# Patient Record
Sex: MALE | Race: WHITE | HISPANIC OR LATINO | Employment: OTHER | URBAN - METROPOLITAN AREA
[De-identification: names, ages, dates, MRNs, and addresses within clinical notes are randomized per-mention and may not be internally consistent; named-entity substitution may affect disease eponyms.]

---

## 2020-08-28 ENCOUNTER — APPOINTMENT (OUTPATIENT)
Dept: RADIOLOGY | Facility: MEDICAL CENTER | Age: 65
DRG: 066 | End: 2020-08-28
Attending: EMERGENCY MEDICINE

## 2020-08-28 ENCOUNTER — HOSPITAL ENCOUNTER (INPATIENT)
Facility: MEDICAL CENTER | Age: 65
LOS: 2 days | DRG: 066 | End: 2020-08-30
Attending: EMERGENCY MEDICINE | Admitting: INTERNAL MEDICINE

## 2020-08-28 DIAGNOSIS — R29.90 STROKE-LIKE SYMPTOM: ICD-10-CM

## 2020-08-28 PROBLEM — Z66 DNR (DO NOT RESUSCITATE): Status: ACTIVE | Noted: 2020-08-28

## 2020-08-28 PROBLEM — I16.0 HYPERTENSIVE URGENCY: Status: ACTIVE | Noted: 2020-08-28

## 2020-08-28 LAB
ALBUMIN SERPL BCP-MCNC: 4.5 G/DL (ref 3.2–4.9)
ALBUMIN/GLOB SERPL: 1.4 G/DL
ALP SERPL-CCNC: 76 U/L (ref 30–99)
ALT SERPL-CCNC: 24 U/L (ref 2–50)
ANION GAP SERPL CALC-SCNC: 16 MMOL/L (ref 7–16)
AST SERPL-CCNC: 22 U/L (ref 12–45)
BASOPHILS # BLD AUTO: 0.5 % (ref 0–1.8)
BASOPHILS # BLD: 0.03 K/UL (ref 0–0.12)
BILIRUB SERPL-MCNC: 0.7 MG/DL (ref 0.1–1.5)
BUN SERPL-MCNC: 14 MG/DL (ref 8–22)
CALCIUM SERPL-MCNC: 9.6 MG/DL (ref 8.5–10.5)
CHLORIDE SERPL-SCNC: 103 MMOL/L (ref 96–112)
CO2 SERPL-SCNC: 20 MMOL/L (ref 20–33)
COVID ORDER STATUS COVID19: NORMAL
CREAT SERPL-MCNC: 0.83 MG/DL (ref 0.5–1.4)
EKG IMPRESSION: NORMAL
EOSINOPHIL # BLD AUTO: 0.17 K/UL (ref 0–0.51)
EOSINOPHIL NFR BLD: 3 % (ref 0–6.9)
ERYTHROCYTE [DISTWIDTH] IN BLOOD BY AUTOMATED COUNT: 37.2 FL (ref 35.9–50)
GLOBULIN SER CALC-MCNC: 3.2 G/DL (ref 1.9–3.5)
GLUCOSE SERPL-MCNC: 105 MG/DL (ref 65–99)
HCT VFR BLD AUTO: 47.8 % (ref 42–52)
HGB BLD-MCNC: 16.4 G/DL (ref 14–18)
IMM GRANULOCYTES # BLD AUTO: 0.01 K/UL (ref 0–0.11)
IMM GRANULOCYTES NFR BLD AUTO: 0.2 % (ref 0–0.9)
INR PPP: 0.98 (ref 0.87–1.13)
LYMPHOCYTES # BLD AUTO: 1.93 K/UL (ref 1–4.8)
LYMPHOCYTES NFR BLD: 33.8 % (ref 22–41)
MCH RBC QN AUTO: 29.2 PG (ref 27–33)
MCHC RBC AUTO-ENTMCNC: 34.3 G/DL (ref 33.7–35.3)
MCV RBC AUTO: 85.2 FL (ref 81.4–97.8)
MONOCYTES # BLD AUTO: 0.6 K/UL (ref 0–0.85)
MONOCYTES NFR BLD AUTO: 10.5 % (ref 0–13.4)
NEUTROPHILS # BLD AUTO: 2.97 K/UL (ref 1.82–7.42)
NEUTROPHILS NFR BLD: 52 % (ref 44–72)
NRBC # BLD AUTO: 0 K/UL
NRBC BLD-RTO: 0 /100 WBC
PLATELET # BLD AUTO: 210 K/UL (ref 164–446)
PMV BLD AUTO: 10.5 FL (ref 9–12.9)
POTASSIUM SERPL-SCNC: 3.5 MMOL/L (ref 3.6–5.5)
PROT SERPL-MCNC: 7.7 G/DL (ref 6–8.2)
PROTHROMBIN TIME: 13.2 SEC (ref 12–14.6)
RBC # BLD AUTO: 5.61 M/UL (ref 4.7–6.1)
SARS-COV-2 RNA RESP QL NAA+PROBE: NOTDETECTED
SODIUM SERPL-SCNC: 139 MMOL/L (ref 135–145)
SPECIMEN SOURCE: NORMAL
WBC # BLD AUTO: 5.7 K/UL (ref 4.8–10.8)

## 2020-08-28 PROCEDURE — 770020 HCHG ROOM/CARE - TELE (206)

## 2020-08-28 PROCEDURE — U0003 INFECTIOUS AGENT DETECTION BY NUCLEIC ACID (DNA OR RNA); SEVERE ACUTE RESPIRATORY SYNDROME CORONAVIRUS 2 (SARS-COV-2) (CORONAVIRUS DISEASE [COVID-19]), AMPLIFIED PROBE TECHNIQUE, MAKING USE OF HIGH THROUGHPUT TECHNOLOGIES AS DESCRIBED BY CMS-2020-01-R: HCPCS

## 2020-08-28 PROCEDURE — 700101 HCHG RX REV CODE 250: Performed by: INTERNAL MEDICINE

## 2020-08-28 PROCEDURE — 93005 ELECTROCARDIOGRAM TRACING: CPT

## 2020-08-28 PROCEDURE — A9270 NON-COVERED ITEM OR SERVICE: HCPCS | Performed by: INTERNAL MEDICINE

## 2020-08-28 PROCEDURE — C9803 HOPD COVID-19 SPEC COLLECT: HCPCS | Performed by: INTERNAL MEDICINE

## 2020-08-28 PROCEDURE — 99223 1ST HOSP IP/OBS HIGH 75: CPT | Performed by: INTERNAL MEDICINE

## 2020-08-28 PROCEDURE — 70450 CT HEAD/BRAIN W/O DYE: CPT

## 2020-08-28 PROCEDURE — 700102 HCHG RX REV CODE 250 W/ 637 OVERRIDE(OP): Performed by: INTERNAL MEDICINE

## 2020-08-28 PROCEDURE — 80053 COMPREHEN METABOLIC PANEL: CPT

## 2020-08-28 PROCEDURE — 96374 THER/PROPH/DIAG INJ IV PUSH: CPT

## 2020-08-28 PROCEDURE — 700101 HCHG RX REV CODE 250: Performed by: EMERGENCY MEDICINE

## 2020-08-28 PROCEDURE — 85025 COMPLETE CBC W/AUTO DIFF WBC: CPT

## 2020-08-28 PROCEDURE — 700105 HCHG RX REV CODE 258: Performed by: INTERNAL MEDICINE

## 2020-08-28 PROCEDURE — 93005 ELECTROCARDIOGRAM TRACING: CPT | Performed by: EMERGENCY MEDICINE

## 2020-08-28 PROCEDURE — 92610 EVALUATE SWALLOWING FUNCTION: CPT

## 2020-08-28 PROCEDURE — 85610 PROTHROMBIN TIME: CPT

## 2020-08-28 PROCEDURE — 99285 EMERGENCY DEPT VISIT HI MDM: CPT

## 2020-08-28 RX ORDER — SODIUM CHLORIDE 9 MG/ML
1000 INJECTION, SOLUTION INTRAVENOUS ONCE
Status: DISCONTINUED | OUTPATIENT
Start: 2020-08-29 | End: 2020-08-29

## 2020-08-28 RX ORDER — LORAZEPAM 2 MG/ML
1 INJECTION INTRAMUSCULAR
Status: COMPLETED | OUTPATIENT
Start: 2020-08-28 | End: 2020-08-29

## 2020-08-28 RX ORDER — AMOXICILLIN 250 MG
2 CAPSULE ORAL 2 TIMES DAILY
Status: DISCONTINUED | OUTPATIENT
Start: 2020-08-28 | End: 2020-08-30 | Stop reason: HOSPADM

## 2020-08-28 RX ORDER — POLYETHYLENE GLYCOL 3350 17 G/17G
1 POWDER, FOR SOLUTION ORAL
Status: DISCONTINUED | OUTPATIENT
Start: 2020-08-28 | End: 2020-08-30 | Stop reason: HOSPADM

## 2020-08-28 RX ORDER — LISINOPRIL 20 MG/1
20 TABLET ORAL ONCE
Status: DISCONTINUED | OUTPATIENT
Start: 2020-08-29 | End: 2020-08-28

## 2020-08-28 RX ORDER — ASPIRIN 81 MG/1
81 TABLET, CHEWABLE ORAL DAILY
COMMUNITY

## 2020-08-28 RX ORDER — SODIUM CHLORIDE 9 MG/ML
1000 INJECTION, SOLUTION INTRAVENOUS ONCE
Status: COMPLETED | OUTPATIENT
Start: 2020-08-28 | End: 2020-08-29

## 2020-08-28 RX ORDER — LISINOPRIL 10 MG/1
10 TABLET ORAL ONCE
Status: COMPLETED | OUTPATIENT
Start: 2020-08-28 | End: 2020-08-28

## 2020-08-28 RX ORDER — ASPIRIN 325 MG
325 TABLET ORAL DAILY
Status: DISCONTINUED | OUTPATIENT
Start: 2020-08-28 | End: 2020-08-29

## 2020-08-28 RX ORDER — ASPIRIN 300 MG/1
300 SUPPOSITORY RECTAL DAILY
Status: DISCONTINUED | OUTPATIENT
Start: 2020-08-28 | End: 2020-08-29

## 2020-08-28 RX ORDER — LABETALOL HYDROCHLORIDE 5 MG/ML
20 INJECTION, SOLUTION INTRAVENOUS ONCE
Status: COMPLETED | OUTPATIENT
Start: 2020-08-28 | End: 2020-08-28

## 2020-08-28 RX ORDER — LISINOPRIL 10 MG/1
10 TABLET ORAL
Status: DISCONTINUED | OUTPATIENT
Start: 2020-08-28 | End: 2020-08-28

## 2020-08-28 RX ORDER — LISINOPRIL 20 MG/1
20 TABLET ORAL
Status: DISCONTINUED | OUTPATIENT
Start: 2020-08-29 | End: 2020-08-29

## 2020-08-28 RX ORDER — BISACODYL 10 MG
10 SUPPOSITORY, RECTAL RECTAL
Status: DISCONTINUED | OUTPATIENT
Start: 2020-08-28 | End: 2020-08-30 | Stop reason: HOSPADM

## 2020-08-28 RX ORDER — SODIUM CHLORIDE 9 MG/ML
1000 INJECTION, SOLUTION INTRAVENOUS ONCE
Status: DISCONTINUED | OUTPATIENT
Start: 2020-08-28 | End: 2020-08-28

## 2020-08-28 RX ORDER — ACETAMINOPHEN 325 MG/1
650 TABLET ORAL EVERY 6 HOURS PRN
Status: DISCONTINUED | OUTPATIENT
Start: 2020-08-28 | End: 2020-08-30 | Stop reason: HOSPADM

## 2020-08-28 RX ORDER — LABETALOL HYDROCHLORIDE 5 MG/ML
10 INJECTION, SOLUTION INTRAVENOUS EVERY 4 HOURS PRN
Status: DISCONTINUED | OUTPATIENT
Start: 2020-08-28 | End: 2020-08-30 | Stop reason: HOSPADM

## 2020-08-28 RX ORDER — LISINOPRIL 10 MG/1
10 TABLET ORAL ONCE
Status: DISCONTINUED | OUTPATIENT
Start: 2020-08-28 | End: 2020-08-28

## 2020-08-28 RX ORDER — ASPIRIN 81 MG/1
324 TABLET, CHEWABLE ORAL DAILY
Status: DISCONTINUED | OUTPATIENT
Start: 2020-08-28 | End: 2020-08-29

## 2020-08-28 RX ADMIN — LABETALOL HYDROCHLORIDE 10 MG: 5 INJECTION INTRAVENOUS at 15:55

## 2020-08-28 RX ADMIN — LISINOPRIL 10 MG: 10 TABLET ORAL at 13:39

## 2020-08-28 RX ADMIN — SODIUM CHLORIDE 1000 ML: 9 INJECTION, SOLUTION INTRAVENOUS at 15:55

## 2020-08-28 RX ADMIN — ASPIRIN 325 MG: 325 TABLET ORAL at 13:39

## 2020-08-28 RX ADMIN — LISINOPRIL 10 MG: 10 TABLET ORAL at 18:45

## 2020-08-28 RX ADMIN — LABETALOL HYDROCHLORIDE 20 MG: 5 INJECTION, SOLUTION INTRAVENOUS at 11:40

## 2020-08-28 ASSESSMENT — ENCOUNTER SYMPTOMS
SHORTNESS OF BREATH: 0
CHILLS: 0
HALLUCINATIONS: 0
DIZZINESS: 0
TINGLING: 0
NECK PAIN: 0
ORTHOPNEA: 0
COUGH: 0
FEVER: 0
BACK PAIN: 0
ABDOMINAL PAIN: 0
MYALGIAS: 0
DIARRHEA: 0
WEIGHT LOSS: 0
CONSTIPATION: 0
SPUTUM PRODUCTION: 0
FOCAL WEAKNESS: 0
PALPITATIONS: 0
EYE PAIN: 0
BLURRED VISION: 0
PHOTOPHOBIA: 0
TREMORS: 0
NAUSEA: 0
HEADACHES: 0
SENSORY CHANGE: 0
VOMITING: 0
DOUBLE VISION: 0
SPEECH CHANGE: 1

## 2020-08-28 ASSESSMENT — PATIENT HEALTH QUESTIONNAIRE - PHQ9
1. LITTLE INTEREST OR PLEASURE IN DOING THINGS: NOT AT ALL
2. FEELING DOWN, DEPRESSED, IRRITABLE, OR HOPELESS: NOT AT ALL
SUM OF ALL RESPONSES TO PHQ9 QUESTIONS 1 AND 2: 0

## 2020-08-28 ASSESSMENT — LIFESTYLE VARIABLES: SUBSTANCE_ABUSE: 0

## 2020-08-28 ASSESSMENT — NEW YORK HEART ASSOCIATION (NYHA) CLASSIFICATION
NYHA FUNCTIONAL CLASS: I - NO SYMPTOMS AND NO LIMITATION IN ORDINARY PHYSICAL ACTIVITY, E.G. SHORTNESS OF BREATH WHEN WALKING, CLIMBING STAIRS ETC.

## 2020-08-28 NOTE — H&P
Hospital Medicine History & Physical Note    Date of Service  8/28/2020    Primary Care Physician  No primary care provider on file.    Consultants  None    Code Status  DNAR/DNI      I discussed CODE STATUS with him and explained CPR and difference between full code and DNR after discussion he would like to be DNR.    Chief Complaint  Chief Complaint   Patient presents with   • Slurred Speech   • Hypertension       History of Presenting Illness  64 y.o. male with past medical history of hypertension who presented to the hospital 8/28/2020 with complaint of slurred speech for 4 days.  He noted 4 days ago that he felt like he is drunk and he does not drink alcohol and he noticed slurred speech for 4 days.  He reported his slurred speech has been improving but he continued to have deficit.  He denies any symptoms of weakness in his extremities.  Patient family member at the bedside and reported that she noticed that he has been frequently clearing his throat for the last 4 days and she is concerned about aspiration.  She also reported 4 days ago she noticed slight droop on his left side of face and it went away.  He denies any prior similar complaints.  There is no specific aggravating or alleviating factors.  There is no other associated symptoms.  He has history of high blood pressure but he has not been taking his blood pressure medications.  He does not smoke or drink alcohol or use any street drugs.  He denies any other acute complaints or associated symptoms.    He reported that he does not tolerate statins medication.    ER course: He underwent CT head without contrast did not show any acute abnormalities.  He found a significantly elevated blood pressure and he was started on blood pressure medications.  He has been having the symptoms for last 4 days holding permissive hypertension parameters due to symptoms of 4 days.    Review of Systems  Review of Systems   Constitutional: Negative for chills, fever and  weight loss.   HENT: Negative for hearing loss and tinnitus.    Eyes: Negative for blurred vision, double vision, photophobia and pain.   Respiratory: Negative for cough, sputum production and shortness of breath.    Cardiovascular: Negative for chest pain, palpitations, orthopnea and leg swelling.   Gastrointestinal: Negative for abdominal pain, constipation, diarrhea, nausea and vomiting.   Genitourinary: Negative for dysuria, frequency and urgency.   Musculoskeletal: Negative for back pain, joint pain, myalgias and neck pain.   Skin: Negative for rash.   Neurological: Positive for speech change. Negative for dizziness, tingling, tremors, sensory change, focal weakness and headaches.   Psychiatric/Behavioral: Negative for hallucinations and substance abuse.   All other systems reviewed and are negative.      Past Medical History   has a past medical history of Hypertension.    Surgical History   has a past surgical history that includes other cardiac surgery and other.     Family History  I reviewed family history with patient he does not know his family history.    Social History   reports that he has never smoked. He has never used smokeless tobacco. He reports previous alcohol use. He reports that he does not use drugs.    Allergies  No Known Allergies    Medications  Prior to Admission Medications   Prescriptions Last Dose Informant Patient Reported? Taking?   aspirin (ASA) 81 MG Chew Tab chewable tablet 8/28/2020 at 1000  Yes Yes   Sig: Take 81 mg by mouth every day.      Facility-Administered Medications: None       Physical Exam  Temp:  [36.8 °C (98.2 °F)] 36.8 °C (98.2 °F)  Pulse:  [66-89] 66  Resp:  [16-29] 21  BP: (201-230)/(114-141) 211/114  SpO2:  [92 %-95 %] 94 %    Physical Exam  Vitals signs reviewed.   Constitutional:       General: He is not in acute distress.     Appearance: He is obese.   HENT:      Head: Normocephalic and atraumatic. No contusion.      Right Ear: External ear normal.      Left  Ear: External ear normal.      Nose: Nose normal.      Mouth/Throat:      Mouth: Mucous membranes are dry.      Pharynx: No oropharyngeal exudate.   Eyes:      General:         Right eye: No discharge.         Left eye: No discharge.      Pupils: Pupils are equal, round, and reactive to light.   Neck:      Musculoskeletal: No neck rigidity or muscular tenderness.   Cardiovascular:      Rate and Rhythm: Normal rate and regular rhythm.      Heart sounds: No murmur. No friction rub. No gallop.    Pulmonary:      Effort: Pulmonary effort is normal.      Breath sounds: No wheezing or rhonchi.   Abdominal:      General: Bowel sounds are normal. There is no distension.      Palpations: Abdomen is soft.      Tenderness: There is no abdominal tenderness. There is no rebound.   Musculoskeletal: Normal range of motion.         General: No swelling or tenderness.   Skin:     General: Skin is warm and dry.      Coloration: Skin is not jaundiced.   Neurological:      General: No focal deficit present.      Mental Status: He is alert and oriented to person, place, and time.      Cranial Nerves: No cranial nerve deficit.      Sensory: No sensory deficit.      Comments: Motor 5/5 in all 4 extremities.  No sensory deficit.  Slurred speech   Psychiatric:         Mood and Affect: Mood normal.         Laboratory:  Recent Labs     08/28/20  1050   WBC 5.7   RBC 5.61   HEMOGLOBIN 16.4   HEMATOCRIT 47.8   MCV 85.2   MCH 29.2   MCHC 34.3   RDW 37.2   PLATELETCT 210   MPV 10.5     Recent Labs     08/28/20  1050   SODIUM 139   POTASSIUM 3.5*   CHLORIDE 103   CO2 20   GLUCOSE 105*   BUN 14   CREATININE 0.83   CALCIUM 9.6     Recent Labs     08/28/20  1050   ALTSGPT 24   ASTSGOT 22   ALKPHOSPHAT 76   TBILIRUBIN 0.7   GLUCOSE 105*     Recent Labs     08/28/20  1050   INR 0.98     No results for input(s): NTPROBNP in the last 72 hours.      No results for input(s): TROPONINT in the last 72 hours.    Imaging:  CT-HEAD W/O   Final Result      1.   No evidence of acute intracranial process.      2.  Moderate small vessel ischemic change.            Assessment/Plan:  I anticipate this patient will require at least two midnights for appropriate medical management, necessitating inpatient admission.    Stroke-like symptoms  Assessment & Plan  He presented with strokelike symptoms for 4 days which include slurred speech and possible left-sided facial droop.  I started him on non-TPA stroke protocol order set.  He found to have significantly elevated blood pressure as his symptoms has been going on for the last 4 days I am holding permissive hypertension and started him on blood pressure medications.  I started him on aspirin.  He does not tolerate statin I am holding statin therapy.  I ordered lipid profile and HbA1c.  Every 4 hours neuro check.  Admit to telemetry bed.  PT/OT evaluation  CT head without contrast did not show any acute abnormalities.  He reported he has mild claustrophobia order MRI brain without contrast, ultrasound carotid and echocardiogram.  I discussed plan of care with bedside RN regarding his current medical condition and management.  If he found to have a stroke on MRI he will need neurology consultation.      DNR (do not resuscitate)  Assessment & Plan  I discussed CODE STATUS with him and explained CPR and difference between full code and DNR after discussion he would like to be DNR.    Hypertensive urgency  Assessment & Plan  He found to have significantly elevated systolic blood pressure above 200.  I started him on lisinopril 10 mg.  I ordered echocardiogram.  I ordered labetalol 10 mg every 4 hours as needed with parameters  Avoid correction of blood pressure more than 25% in first 12 hours.  I discussed plan of care with patient.    DVT prophylaxis: SCDs for now if his MRI will not show any acute abnormalities he may need pharmacological DVT prophylaxis.

## 2020-08-28 NOTE — ED TRIAGE NOTES
"Pt amb to room wearing a mask.  Chief Complaint   Patient presents with   • Slurred Speech   • Hypertension     Pt reports sudden onset of slurred speech 4d ago while sitting. Pt reports \"I just felt drunk.\" Denies drinking. Symptoms have slowly improved.  equal. Face symmetrical. Pt states he did not come to ED \"because I don't have insurance.\" Pt hypertensive in triage. Reports he stopped taking bp meds 1 yr ago r/t lack of insurance.     Pt denies cough, fever, sob or any known contact with a person that has tested positive for covid.    EKG complete.  "

## 2020-08-28 NOTE — ASSESSMENT & PLAN NOTE
Present on Admission  -Diagnosed this Admission on MRI (Initial CT Head was negative in ER)  -4 days outside Therapeutic Window  -Was started on non-TPA stroke protocol order set.  -Hypertensive Urgency managed  -Patient does not tolerate Statins (tried 3), has severe head to toe muscle cramping and pain.  -PT/OT/SLP evaluated patient, recommended for home.  >Social Work Consulted to assist/guide patient towards restarting his insurance vs. Enrolling in medicaid.  >PSCK9-I' on discharge, as unable to prescribe Statin due to significant AE from more than 3 statin types.  >Discharge with BP management per med rec  >Discharge with 21 days plavix and RISHI, continue Baby aspirin indefinitely or per PMD  >Self-monitor BP at home 2-3 times per day at different times. Go to ER if >180/110 and prompt f/u with PMD if >160/100 on multiple occasions.   >F/u with PMD within 3 days.  >Return to ER if any new symptoms develop.

## 2020-08-28 NOTE — PROGRESS NOTES
Assumed care of pt at 1530 when pt was brought to unit from ED.  Pt alert and oriented x4.  Denies any pain or discomfort.  PERRLA and extremity strength equal bilaterally.  He scored a 3 on the NIH scale for minor expressive aphasia and dysarthria.  ED RN told me he was on a cardiac diet but due to CVA protocol, pt automatically failed RN Eval d/t speech.  Upon admit to unit, /103.  Called Dr. Ackerman to see if he wanted permissive hypertension orders.  MD stated no since pt's sx started 4 days ago.  MD stated to give PRN Labetolol and was updated about fail of swallow eval.  MD gave order to make pt NPO.  SLP called but will likely not see pt until tomorrow.  Pt verbalizes understanding.  IVF infusing per MAR.  Bed low and locked, alarm set and call bell within reach.  Hourly rounding continues.    1650: Dr. Meka Farmer Texted for update that pt's BP is 176/104, despite administration of PRN Labetolol 1 hour ago.  Wondering if MD wants to order anything else.  Awaiting response.     1700: Dr. Ackerman called this RN back.  He verbalized that since pt's BP was 230 systolic in ED, he doesn't want to drop him too low at this time and is okay with 176 SBP.  MD stated to just recheck in 1 hour.  WC.    1810: Called Dr. Ackerman regarding repeat /107.  He stated he would add a 1x dose Lisinopril

## 2020-08-28 NOTE — ED PROVIDER NOTES
ED Provider Note    CHIEF COMPLAINT  Chief Complaint   Patient presents with   • Slurred Speech   • Hypertension       HPI  Fawad Florentino is a 64 y.o. male here for evaluation of strokelike symptoms.  The patient states that 4 days ago he was going to sit down, when he noticed that he had some dizziness and trouble speaking.  He states that he has had this trouble speaking since that time 4 days ago.  He denies any fever chills, vomiting, chest pain, or shortness of breath.  He has no headache.  Patient does not take any anticoagulants.  Nothing seems to alleviate or exacerbate his symptoms, he has been eating and drinking as per the usual, but is clearing his throat more often per his wife.  He denies any history of the same.  He does have some high blood pressure, and was supposed to be taking blood pressure medications, but has not been taking these in over a year secondary to his insurance issues.  Patient states that the symptoms have been persistent since this started 4 days ago.  He denies any fall or trauma.      ROS  See HPI for further details, o/w negative.     PAST MEDICAL HISTORY   has a past medical history of Hypertension.    SOCIAL HISTORY  Social History     Tobacco Use   • Smoking status: Never Smoker   • Smokeless tobacco: Never Used   Substance and Sexual Activity   • Alcohol use: Not Currently   • Drug use: Never   • Sexual activity: Not on file       Family History  No bleeding disorders    SURGICAL HISTORY   has a past surgical history that includes other cardiac surgery and other.    CURRENT MEDICATIONS  Home Medications     Reviewed by Nguyen Farrell (Pharmacy Tech) on 08/28/20 at 1135  Med List Status: Complete   Medication Last Dose Status   aspirin (ASA) 81 MG Chew Tab chewable tablet 8/28/2020 Active                ALLERGIES  No Known Allergies    REVIEW OF SYSTEMS  See HPI for further details. Review of systems as above, otherwise all other systems are negative.     PHYSICAL  EXAM  Constitutional: Well developed, well nourished. No acute distress.  HEENT: Normocephalic, atraumatic. Posterior pharynx clear and moist.  Eyes:  EOMI. Normal sclera.  Neck: Supple, Full range of motion, nontender.  Chest/Pulmonary: clear to ausculation. Symmetrical expansion.   Cardio: Regular rate and rhythm with no murmur.   Abdomen: Soft, nontender. No peritoneal signs. No guarding. No palpable masses.  Musculoskeletal: No deformity, no edema, neurovascular intact.   Neuro: Slurred speech, appropriate, cooperative, cranial nerves II-XII grossly intact.  Moves all extremities x4, negative Romberg, dorsi plantar flex extension 5 out of 5.  Psych: Normal mood and affect    PROCEDURES     MEDICAL RECORD  I have reviewed patient's medical record and pertinent results are listed.    COURSE & MEDICAL DECISION MAKING  I have reviewed any medical record information, laboratory studies and radiographic results as noted above.    Results for orders placed or performed during the hospital encounter of 08/28/20   CBC WITH DIFFERENTIAL   Result Value Ref Range    WBC 5.7 4.8 - 10.8 K/uL    RBC 5.61 4.70 - 6.10 M/uL    Hemoglobin 16.4 14.0 - 18.0 g/dL    Hematocrit 47.8 42.0 - 52.0 %    MCV 85.2 81.4 - 97.8 fL    MCH 29.2 27.0 - 33.0 pg    MCHC 34.3 33.7 - 35.3 g/dL    RDW 37.2 35.9 - 50.0 fL    Platelet Count 210 164 - 446 K/uL    MPV 10.5 9.0 - 12.9 fL    Neutrophils-Polys 52.00 44.00 - 72.00 %    Lymphocytes 33.80 22.00 - 41.00 %    Monocytes 10.50 0.00 - 13.40 %    Eosinophils 3.00 0.00 - 6.90 %    Basophils 0.50 0.00 - 1.80 %    Immature Granulocytes 0.20 0.00 - 0.90 %    Nucleated RBC 0.00 /100 WBC    Neutrophils (Absolute) 2.97 1.82 - 7.42 K/uL    Lymphs (Absolute) 1.93 1.00 - 4.80 K/uL    Monos (Absolute) 0.60 0.00 - 0.85 K/uL    Eos (Absolute) 0.17 0.00 - 0.51 K/uL    Baso (Absolute) 0.03 0.00 - 0.12 K/uL    Immature Granulocytes (abs) 0.01 0.00 - 0.11 K/uL    NRBC (Absolute) 0.00 K/uL   COMP METABOLIC PANEL    Result Value Ref Range    Sodium 139 135 - 145 mmol/L    Potassium 3.5 (L) 3.6 - 5.5 mmol/L    Chloride 103 96 - 112 mmol/L    Co2 20 20 - 33 mmol/L    Anion Gap 16.0 7.0 - 16.0    Glucose 105 (H) 65 - 99 mg/dL    Bun 14 8 - 22 mg/dL    Creatinine 0.83 0.50 - 1.40 mg/dL    Calcium 9.6 8.5 - 10.5 mg/dL    AST(SGOT) 22 12 - 45 U/L    ALT(SGPT) 24 2 - 50 U/L    Alkaline Phosphatase 76 30 - 99 U/L    Total Bilirubin 0.7 0.1 - 1.5 mg/dL    Albumin 4.5 3.2 - 4.9 g/dL    Total Protein 7.7 6.0 - 8.2 g/dL    Globulin 3.2 1.9 - 3.5 g/dL    A-G Ratio 1.4 g/dL   PT/INR   Result Value Ref Range    PT 13.2 12.0 - 14.6 sec    INR 0.98 0.87 - 1.13   ESTIMATED GFR   Result Value Ref Range    GFR If African American >60 >60 mL/min/1.73 m 2    GFR If Non African American >60 >60 mL/min/1.73 m 2   EKG (NOW)   Result Value Ref Range    Report       [facility] Emergency Dept.    Test Date:  2020  Pt Name:    KIRT NO                    Department: ER  MRN:        [id number]                      Room:  Gender:     Male                         Technician: [id number]  :        1955                   Requested By:ER TRIAGE PROTOCOL  Order #:    [id number]                    Reading MD:    Measurements  Intervals                                Axis  Rate:       87                           P:          38  KY:         164                          QRS:        7  QRSD:       100                          T:          75  QT:         396  QTc:        477    Interpretive Statements  SINUS RHYTHM  VENTRICULAR PREMATURE COMPLEX  PROBABLE LEFT ATRIAL ABNORMALITY  EARLY PRECORDIAL R/S TRANSITION  BORDERLINE INFERIOR Q WAVES  MINIMAL ST DEPRESSION, LATERAL LEADS  BORDERLINE PROLONGED QT INTERVAL  No previous ECG available for comparison       CT-HEAD W/O   Final Result      1.  No evidence of acute intracranial process.      2.  Moderate small vessel ischemic change.          10:50 AM  Only slurred speech, no other deficits.      11:45 AM  Pt remains unchanged.     12:00 pm  Pt was given labetalol push vis Vic (pharmacy) recommendation.      12:45 PM  Pt will be admitted to the hospitalist    Ekg; nsr 87. No st elevation, no st depression, qtc 477.  Pr 164    CRITICAL CARE  The very real possibility of a deterioration of this patient's condition required the highest level of my preparedness for sudden, emergent intervention.  I provided critical care services, which included medication orders, frequent reevaluations of the patient's condition and response to treatment, ordering and reviewing test results, and discussing the case with various consultants.  The critical care time associated with the care of the patient was 45 minutes. Review chart for interventions. This time is exclusive of any other billable procedures.           FINAL IMPRESSION  1. Stroke-like symptom     2.      Critical care time 45 minutes.         Electronically signed by: Arpit Ansari D.O., 8/28/2020 11:35 AM

## 2020-08-28 NOTE — CARE PLAN
Problem: Communication  Goal: The ability to communicate needs accurately and effectively will improve  Outcome: PROGRESSING AS EXPECTED  Pt with minor expressive aphasia and dysarthria; able to make needs known.     Problem: Safety  Goal: Will remain free from injury  Outcome: PROGRESSING AS EXPECTED  Bed low and locked, alarm set, call bell within reach.  Hourly rounding continues.

## 2020-08-28 NOTE — ASSESSMENT & PLAN NOTE
Patient will need insurance for medications for anticipated discharge. Patient is noncompliant, therefore attempt to have all meds be once/day underway.  -Systolic BP > 200 on presentation, now in 140's (went to 160s after ambulating with PT)  -Possible Hx of Afib, patient was on metoprolol in the past, to restart pending verification.  -Echo EF 50: Normal chamber size, systolic, and diastolic function. No R>L shunt on bubble study.  >Increase Nifedipine to 60mg XR daily today  >Increase Lisinopril to 40mg daily today  >Attempt to Taper Hydralazine 25 q8hrs as patient likely noncompliant with this regimen.  >Will d/c Labetalol on d/c, for breakthrough HTN only.

## 2020-08-28 NOTE — PROGRESS NOTES
2 RN skin check completed with FELISA Avilez.  Pt has a scabbed abrasion on his right anterior shin.  Skin is otherwise intact.

## 2020-08-29 ENCOUNTER — APPOINTMENT (OUTPATIENT)
Dept: RADIOLOGY | Facility: MEDICAL CENTER | Age: 65
DRG: 066 | End: 2020-08-29
Attending: INTERNAL MEDICINE

## 2020-08-29 ENCOUNTER — APPOINTMENT (OUTPATIENT)
Dept: CARDIOLOGY | Facility: MEDICAL CENTER | Age: 65
DRG: 066 | End: 2020-08-29
Attending: NURSE PRACTITIONER

## 2020-08-29 DIAGNOSIS — I63.81 STROKE, LACUNAR (HCC): ICD-10-CM

## 2020-08-29 LAB
ALBUMIN SERPL BCP-MCNC: 3.7 G/DL (ref 3.2–4.9)
ALBUMIN/GLOB SERPL: 1.5 G/DL
ALP SERPL-CCNC: 60 U/L (ref 30–99)
ALT SERPL-CCNC: 18 U/L (ref 2–50)
ANION GAP SERPL CALC-SCNC: 11 MMOL/L (ref 7–16)
AST SERPL-CCNC: 13 U/L (ref 12–45)
BASOPHILS # BLD AUTO: 0.4 % (ref 0–1.8)
BASOPHILS # BLD: 0.02 K/UL (ref 0–0.12)
BILIRUB SERPL-MCNC: 0.6 MG/DL (ref 0.1–1.5)
BUN SERPL-MCNC: 12 MG/DL (ref 8–22)
CALCIUM SERPL-MCNC: 8.8 MG/DL (ref 8.5–10.5)
CHLORIDE SERPL-SCNC: 107 MMOL/L (ref 96–112)
CHOLEST SERPL-MCNC: 180 MG/DL (ref 100–199)
CO2 SERPL-SCNC: 21 MMOL/L (ref 20–33)
CREAT SERPL-MCNC: 0.7 MG/DL (ref 0.5–1.4)
EOSINOPHIL # BLD AUTO: 0.18 K/UL (ref 0–0.51)
EOSINOPHIL NFR BLD: 3.2 % (ref 0–6.9)
ERYTHROCYTE [DISTWIDTH] IN BLOOD BY AUTOMATED COUNT: 37.1 FL (ref 35.9–50)
EST. AVERAGE GLUCOSE BLD GHB EST-MCNC: 117 MG/DL
GLOBULIN SER CALC-MCNC: 2.4 G/DL (ref 1.9–3.5)
GLUCOSE SERPL-MCNC: 105 MG/DL (ref 65–99)
HBA1C MFR BLD: 5.7 % (ref 0–5.6)
HCT VFR BLD AUTO: 39.4 % (ref 42–52)
HDLC SERPL-MCNC: 37 MG/DL
HGB BLD-MCNC: 14 G/DL (ref 14–18)
IMM GRANULOCYTES # BLD AUTO: 0.01 K/UL (ref 0–0.11)
IMM GRANULOCYTES NFR BLD AUTO: 0.2 % (ref 0–0.9)
LDLC SERPL CALC-MCNC: 121 MG/DL
LV EJECT FRACT  99904: 50
LV EJECT FRACT MOD 2C 99903: 52.34
LV EJECT FRACT MOD 4C 99902: 56.66
LV EJECT FRACT MOD BP 99901: 55.69
LYMPHOCYTES # BLD AUTO: 1.6 K/UL (ref 1–4.8)
LYMPHOCYTES NFR BLD: 28.1 % (ref 22–41)
MCH RBC QN AUTO: 29.9 PG (ref 27–33)
MCHC RBC AUTO-ENTMCNC: 35.5 G/DL (ref 33.7–35.3)
MCV RBC AUTO: 84.2 FL (ref 81.4–97.8)
MONOCYTES # BLD AUTO: 0.66 K/UL (ref 0–0.85)
MONOCYTES NFR BLD AUTO: 11.6 % (ref 0–13.4)
NEUTROPHILS # BLD AUTO: 3.22 K/UL (ref 1.82–7.42)
NEUTROPHILS NFR BLD: 56.5 % (ref 44–72)
NRBC # BLD AUTO: 0 K/UL
NRBC BLD-RTO: 0 /100 WBC
PLATELET # BLD AUTO: 187 K/UL (ref 164–446)
PMV BLD AUTO: 10 FL (ref 9–12.9)
POTASSIUM SERPL-SCNC: 3.3 MMOL/L (ref 3.6–5.5)
PROT SERPL-MCNC: 6.1 G/DL (ref 6–8.2)
RBC # BLD AUTO: 4.68 M/UL (ref 4.7–6.1)
SODIUM SERPL-SCNC: 139 MMOL/L (ref 135–145)
TRIGL SERPL-MCNC: 109 MG/DL (ref 0–149)
WBC # BLD AUTO: 5.7 K/UL (ref 4.8–10.8)

## 2020-08-29 PROCEDURE — 97165 OT EVAL LOW COMPLEX 30 MIN: CPT

## 2020-08-29 PROCEDURE — 80061 LIPID PANEL: CPT

## 2020-08-29 PROCEDURE — 700102 HCHG RX REV CODE 250 W/ 637 OVERRIDE(OP): Performed by: INTERNAL MEDICINE

## 2020-08-29 PROCEDURE — 770020 HCHG ROOM/CARE - TELE (206)

## 2020-08-29 PROCEDURE — 700111 HCHG RX REV CODE 636 W/ 250 OVERRIDE (IP): Performed by: INTERNAL MEDICINE

## 2020-08-29 PROCEDURE — 93880 EXTRACRANIAL BILAT STUDY: CPT

## 2020-08-29 PROCEDURE — 93306 TTE W/DOPPLER COMPLETE: CPT | Mod: 26 | Performed by: INTERNAL MEDICINE

## 2020-08-29 PROCEDURE — 85025 COMPLETE CBC W/AUTO DIFF WBC: CPT

## 2020-08-29 PROCEDURE — 80053 COMPREHEN METABOLIC PANEL: CPT

## 2020-08-29 PROCEDURE — A9270 NON-COVERED ITEM OR SERVICE: HCPCS | Performed by: INTERNAL MEDICINE

## 2020-08-29 PROCEDURE — 83036 HEMOGLOBIN GLYCOSYLATED A1C: CPT

## 2020-08-29 PROCEDURE — 99232 SBSQ HOSP IP/OBS MODERATE 35: CPT | Performed by: STUDENT IN AN ORGANIZED HEALTH CARE EDUCATION/TRAINING PROGRAM

## 2020-08-29 PROCEDURE — 36415 COLL VENOUS BLD VENIPUNCTURE: CPT

## 2020-08-29 PROCEDURE — 700101 HCHG RX REV CODE 250: Performed by: INTERNAL MEDICINE

## 2020-08-29 PROCEDURE — A9270 NON-COVERED ITEM OR SERVICE: HCPCS | Performed by: PSYCHIATRY & NEUROLOGY

## 2020-08-29 PROCEDURE — 93306 TTE W/DOPPLER COMPLETE: CPT

## 2020-08-29 PROCEDURE — 99223 1ST HOSP IP/OBS HIGH 75: CPT | Performed by: NURSE PRACTITIONER

## 2020-08-29 PROCEDURE — A9270 NON-COVERED ITEM OR SERVICE: HCPCS | Performed by: STUDENT IN AN ORGANIZED HEALTH CARE EDUCATION/TRAINING PROGRAM

## 2020-08-29 PROCEDURE — 700102 HCHG RX REV CODE 250 W/ 637 OVERRIDE(OP): Performed by: PSYCHIATRY & NEUROLOGY

## 2020-08-29 PROCEDURE — 700102 HCHG RX REV CODE 250 W/ 637 OVERRIDE(OP): Performed by: STUDENT IN AN ORGANIZED HEALTH CARE EDUCATION/TRAINING PROGRAM

## 2020-08-29 PROCEDURE — 70551 MRI BRAIN STEM W/O DYE: CPT

## 2020-08-29 RX ORDER — CLOPIDOGREL BISULFATE 75 MG/1
75 TABLET ORAL DAILY
Status: DISCONTINUED | OUTPATIENT
Start: 2020-08-29 | End: 2020-08-30 | Stop reason: HOSPADM

## 2020-08-29 RX ORDER — LISINOPRIL 20 MG/1
20 TABLET ORAL DAILY
Qty: 30 TAB | Refills: 3 | Status: SHIPPED | OUTPATIENT
Start: 2020-08-30 | End: 2020-08-30

## 2020-08-29 RX ORDER — LISINOPRIL 20 MG/1
40 TABLET ORAL
Status: DISCONTINUED | OUTPATIENT
Start: 2020-08-30 | End: 2020-08-29

## 2020-08-29 RX ORDER — POTASSIUM CHLORIDE 20 MEQ/1
20 TABLET, EXTENDED RELEASE ORAL DAILY
Status: DISCONTINUED | OUTPATIENT
Start: 2020-08-29 | End: 2020-08-29

## 2020-08-29 RX ORDER — HYDRALAZINE HYDROCHLORIDE 25 MG/1
25 TABLET, FILM COATED ORAL EVERY 8 HOURS
Status: DISCONTINUED | OUTPATIENT
Start: 2020-08-29 | End: 2020-08-30

## 2020-08-29 RX ORDER — HYDRALAZINE HYDROCHLORIDE 20 MG/ML
10 INJECTION INTRAMUSCULAR; INTRAVENOUS PRN
Status: DISCONTINUED | OUTPATIENT
Start: 2020-08-29 | End: 2020-08-29

## 2020-08-29 RX ORDER — LISINOPRIL 10 MG/1
10 TABLET ORAL ONCE
Status: DISCONTINUED | OUTPATIENT
Start: 2020-08-29 | End: 2020-08-29

## 2020-08-29 RX ORDER — NIFEDIPINE 30 MG/1
30 TABLET, EXTENDED RELEASE ORAL
Status: DISCONTINUED | OUTPATIENT
Start: 2020-08-29 | End: 2020-08-30

## 2020-08-29 RX ORDER — LISINOPRIL 20 MG/1
40 TABLET ORAL
Status: DISCONTINUED | OUTPATIENT
Start: 2020-08-29 | End: 2020-08-29

## 2020-08-29 RX ORDER — HYDRALAZINE HYDROCHLORIDE 20 MG/ML
10 INJECTION INTRAMUSCULAR; INTRAVENOUS EVERY 4 HOURS PRN
Status: DISCONTINUED | OUTPATIENT
Start: 2020-08-29 | End: 2020-08-30 | Stop reason: HOSPADM

## 2020-08-29 RX ORDER — HYDRALAZINE HYDROCHLORIDE 20 MG/ML
5-10 INJECTION INTRAMUSCULAR; INTRAVENOUS PRN
Status: DISCONTINUED | OUTPATIENT
Start: 2020-08-29 | End: 2020-08-29

## 2020-08-29 RX ORDER — ASPIRIN 81 MG/1
81 TABLET, CHEWABLE ORAL DAILY
Status: DISCONTINUED | OUTPATIENT
Start: 2020-08-30 | End: 2020-08-30 | Stop reason: HOSPADM

## 2020-08-29 RX ORDER — NIFEDIPINE 30 MG
30 TABLET, EXTENDED RELEASE ORAL DAILY
Qty: 30 TAB | Refills: 3 | Status: SHIPPED | OUTPATIENT
Start: 2020-08-29 | End: 2020-08-30

## 2020-08-29 RX ORDER — LISINOPRIL 20 MG/1
20 TABLET ORAL
Status: DISCONTINUED | OUTPATIENT
Start: 2020-08-30 | End: 2020-08-30

## 2020-08-29 RX ORDER — POTASSIUM CHLORIDE 20 MEQ/1
20 TABLET, EXTENDED RELEASE ORAL 2 TIMES DAILY
Status: COMPLETED | OUTPATIENT
Start: 2020-08-29 | End: 2020-08-29

## 2020-08-29 RX ORDER — NIFEDIPINE 30 MG/1
30 TABLET, EXTENDED RELEASE ORAL ONCE
Status: COMPLETED | OUTPATIENT
Start: 2020-08-29 | End: 2020-08-29

## 2020-08-29 RX ADMIN — LORAZEPAM 1 MG: 2 INJECTION INTRAMUSCULAR; INTRAVENOUS at 10:35

## 2020-08-29 RX ADMIN — POTASSIUM CHLORIDE 20 MEQ: 1500 TABLET, EXTENDED RELEASE ORAL at 09:17

## 2020-08-29 RX ADMIN — POTASSIUM CHLORIDE 20 MEQ: 1500 TABLET, EXTENDED RELEASE ORAL at 17:06

## 2020-08-29 RX ADMIN — LABETALOL HYDROCHLORIDE 10 MG: 5 INJECTION INTRAVENOUS at 06:36

## 2020-08-29 RX ADMIN — LABETALOL HYDROCHLORIDE 10 MG: 5 INJECTION INTRAVENOUS at 17:05

## 2020-08-29 RX ADMIN — LABETALOL HYDROCHLORIDE 10 MG: 5 INJECTION INTRAVENOUS at 10:43

## 2020-08-29 RX ADMIN — HYDRALAZINE HYDROCHLORIDE 25 MG: 25 TABLET, FILM COATED ORAL at 18:29

## 2020-08-29 RX ADMIN — NIFEDIPINE 30 MG: 30 TABLET, EXTENDED RELEASE ORAL at 14:31

## 2020-08-29 RX ADMIN — CLOPIDOGREL BISULFATE 75 MG: 75 TABLET ORAL at 14:31

## 2020-08-29 RX ADMIN — DOCUSATE SODIUM 50 MG AND SENNOSIDES 8.6 MG 2 TABLET: 8.6; 5 TABLET, FILM COATED ORAL at 04:07

## 2020-08-29 RX ADMIN — LISINOPRIL 20 MG: 20 TABLET ORAL at 04:07

## 2020-08-29 RX ADMIN — NIFEDIPINE 30 MG: 30 TABLET, EXTENDED RELEASE ORAL at 19:49

## 2020-08-29 RX ADMIN — ASPIRIN 325 MG: 325 TABLET ORAL at 04:07

## 2020-08-29 ASSESSMENT — ENCOUNTER SYMPTOMS
HEMOPTYSIS: 0
ABDOMINAL PAIN: 0
TREMORS: 0
DIAPHORESIS: 0
SEIZURES: 0
BLURRED VISION: 0
SORE THROAT: 0
NAUSEA: 0
SENSORY CHANGE: 0
DOUBLE VISION: 0
PALPITATIONS: 0
TINGLING: 0
MYALGIAS: 0
NERVOUS/ANXIOUS: 0
HEADACHES: 0
CHILLS: 0
WEIGHT LOSS: 0
SPEECH CHANGE: 1
FEVER: 0
DIZZINESS: 0
HEARTBURN: 0
WEAKNESS: 0
COUGH: 0
EYE PAIN: 0
VOMITING: 0
SPUTUM PRODUCTION: 0
SHORTNESS OF BREATH: 0
LOSS OF CONSCIOUSNESS: 0

## 2020-08-29 ASSESSMENT — COGNITIVE AND FUNCTIONAL STATUS - GENERAL
SUGGESTED CMS G CODE MODIFIER DAILY ACTIVITY: CH
DAILY ACTIVITIY SCORE: 24

## 2020-08-29 ASSESSMENT — ACTIVITIES OF DAILY LIVING (ADL): TOILETING: INDEPENDENT

## 2020-08-29 ASSESSMENT — LIFESTYLE VARIABLES: SUBSTANCE_ABUSE: 0

## 2020-08-29 ASSESSMENT — FIBROSIS 4 INDEX: FIB4 SCORE: 1.05

## 2020-08-29 NOTE — PROGRESS NOTES
Monitor summary: SB/SR 54-72, TN 0.20, QRS 0.08, QT 0.38, with occasional/ frequent PVCs, trigeminy, and rare PACs per strip from monitor room.

## 2020-08-29 NOTE — DISCHARGE SUMMARY
Discharge Summary    CHIEF COMPLAINT ON ADMISSION  Chief Complaint   Patient presents with   • Slurred Speech   • Hypertension       Reason for Admission  Other     Admission Date  8/28/2020    CODE STATUS  DNAR/DNI    HPI & HOSPITAL COURSE  64 y.o. male admitted 8/28/2020 with PMHx HTN poorly controlled with Metoprolol, HLD with Insurance lapse of 3 years/noncompliance c/o slurred speech for 4 days.     Patient was admitted with Hypertensive urgency r/o stroke. CT Head w/o contrast in ER did not show any acute abnormalities. Patient was started on Lisinopril and Labetalol prn with minimal improvement in blood pressure. Patient was started on Nifedipine 30mg XR.      MRI on 8/29/2020 demonstrated:  1.  Mild cerebral atrophy.  2.  Multiple old lacunar infarcts bilateral frontal deep white matter, bilateral basal ganglia.  3.  Acute lacunar size infarct in the left posterior limb internal capsule.  4.  Small oblong focus of old hemorrhagic infarction in the left posterior frontal periventricular white matter along the corona radiata just above the left thalamus.  5.  Multiple punctate foci of old microhemorrhage most consistent with old hypertensive microhemorrhage.  6.  Mild pontine ischemic gliosis. Tiny punctate focus of old lacunar size infarction in the left paramedian leila.  7.  Crescentic old lacunar size infarct in the left cerebellar hemisphere and tiny old lacunar infarct in the right cerebellar hemisphere.     Neurology was consulted for acute lacunar stroke and ASA 325mg was changed to DAPT for 21 days per POINT and CHANCE trials. Patient was evaluated as possible candidate for PSCK9. Patient has no insurance but will work to get signed up, social work was informed to assist/guide patient. PT rec home with no further intervention. Patient declined stroke rehab and wished to go home. Patient to follow up with outpatient physician to optimize hytertension. Effort was made to minimize blood pressure  medications to once per day with nifedipine and lisinopril, as patient has history of noncompliance, but pressure was resistant. BP controlled with addition of clonidine 0.1mg BID for now, with close follow up with PMD and Cardiologist. Metoprolol was added for PVC's on a short term basis with option to discontinue by PMD/Cardiologist, as the 10% threshold was approached for CCB/Beta Blocker and likely secondary to prolonged Hypertensive urgency. The PVC frequency was not significantly reduced by Nifedipine, so Metoprolol for 1 month was given on discharge. As statin could not be prescribed upon discharge due to patients' documented Adverse effects from it, Evolocumab was prescribed. The primary mechanism of his Lacunar infarct is thought to be due to longstanding Hypertension, although patients with PVC's have been documented to be associated with lower mortality. Patient verbalized understanding of Medication regimen and discharge plan with follow up with PMD and Cardiologist and is in agreement, Questions were addressed. Patient states he has a home blood pressure machine and agreed to measure it frequently at different times of the day besides the morning-time.    Primary Diagnosis on Discharge: Lacunar Stroke  Secondary Diagnosis on Discharge: Hypertensive Urgency, Resolved while in patient.  D/C Meds: Plavix 75mg daily for 21 days, ASA 81mg daily indefinitely, Nifedipine 60mg daily, Lisinopril 40mg daily, Metoprolol succinate 25mg daily (for PVC's and simplicity of once/day in noncompliant patient, 30 day supply with f/u with PMD regarding need for continuance), Evolocumab injection [id number] for cholesterol, Clonidine 0.1mg BID (for 30 days, f/u with PMD for optimal blood pressure management).          Therefore, he is discharged in good and stable condition to home with close outpatient follow-up.    The patient met 2-midnight criteria for an inpatient stay at the time of discharge.    Discharge  Date  8/29/2020    FOLLOW UP ITEMS POST DISCHARGE  Follow up with PMD for close Hypertension management.    DISCHARGE DIAGNOSES  Active Problems:    Stroke-like symptoms POA: Unknown    Hypertensive urgency POA: Yes    DNR (do not resuscitate) POA: Unknown  Resolved Problems:    * No resolved hospital problems. *      FOLLOW UP  No future appointments.  Follow up with Primary Care Physician    In 3 days        MEDICATIONS ON DISCHARGE     Medication List      START taking these medications      Instructions   lisinopril 20 MG Tabs  Start taking on: August 30, 2020  Commonly known as: PRINIVIL   Take 1 Tab by mouth every day.  Dose: 20 mg     NIFEdipine 30 MG CR tablet  Commonly known as: ADALAT CC   Take 1 Tab by mouth every day.  Dose: 30 mg        CONTINUE taking these medications      Instructions   aspirin 81 MG Chew chewable tablet  Commonly known as: ASA   Take 81 mg by mouth every day.  Dose: 81 mg            Allergies  Allergies   Allergen Reactions   • Statins [Hmg-Coa-R Inhibitors] Myalgia       DIET  Orders Placed This Encounter   Procedures   • Diet Order Cardiac, 2 Gram Sodium     Standing Status:   Standing     Number of Occurrences:   1     Order Specific Question:   Diet:     Answer:   Cardiac [6]     Order Specific Question:   Diet:     Answer:   2 Gram Sodium [7]     Order Specific Question:   Texture Modifier     Answer:   Level 6 - Soft & Bite Sized (Dysphagia 3)     Order Specific Question:   Liquid level     Answer:   Level 0 - Thin       ACTIVITY  As tolerated.  Weight bearing as tolerated    CONSULTATIONS  None    PROCEDURES  None    LABORATORY  Lab Results   Component Value Date    SODIUM 139 08/29/2020    POTASSIUM 3.3 (L) 08/29/2020    CHLORIDE 107 08/29/2020    CO2 21 08/29/2020    GLUCOSE 105 (H) 08/29/2020    BUN 12 08/29/2020    CREATININE 0.70 08/29/2020        Lab Results   Component Value Date    WBC 5.7 08/29/2020    HEMOGLOBIN 14.0 08/29/2020    HEMATOCRIT 39.4 (L) 08/29/2020     PLATELETCT 187 08/29/2020        Total time of the discharge process exceeds 30 minutes.

## 2020-08-29 NOTE — CARE PLAN
Problem: Communication  Goal: The ability to communicate needs accurately and effectively will improve  Outcome: PROGRESSING AS EXPECTED  Intervention: [place] patient and significant other/support system to call light to alert staff of needs  Flowsheets (Taken 8/29/2020 1151)  Oriented to:: All of the Following : Location of Bathroom, Visiting Policy, Unit Routine, Call Light and Bedside Controls, Bedside Rail Policy, Smoking Policy, Rights and Responsibilities, Bedside Report, and Patient Education Notebook  Note: Pt A&Ox4, with dysarthria. Pt calls appropriately using call light and is able to make needs known to staff. Pt oriented to room and call light. Hourly rounding in place.      Problem: Safety  Goal: Will remain free from falls  Outcome: PROGRESSING AS EXPECTED  Intervention: Implement fall precautions  Flowsheets (Taken 8/29/2020 0940)  Environmental Precautions:   Treaded Slipper Socks on Patient   Personal Belongings, Wastebasket, Call Bell etc. in Easy Reach   Transferred to Stronger Side   Report Given to Other Health Care Providers Regarding Fall Risk   Bed in Low Position   Communication Sign for Patients & Families   Mobility Assessed & Appropriate Sign Placed  Note: Pt is a low fall risk. Fall precautions in place. Bed locked and in lowest position. Bed alarm on, call light within reach, non skid socks in place.

## 2020-08-29 NOTE — DISCHARGE PLANNING
[facility] Acute Rehabilitation Transitional Care Coordination     Referral from:  Meka Evans indicates: none listed at this time.   Potential Rehab Diagnosis: Stroke work up    Chart review indicates patient has on going medical management and may have therapy needs to possibly meet inpatient rehab facility criteria with the goal of returning to community.    D/C support: Lives alone in [place]      Physiatry consultation pended per protocol. PT/OT evals ordered; SLP does not anticipate continued SLP services      Will follow.   Thank you for the referral.

## 2020-08-29 NOTE — CARE PLAN
Problem: Communication  Goal: The ability to communicate needs accurately and effectively will improve  Outcome: PROGRESSING AS EXPECTED  Note: Patient admitted for stroke work-up, continuing to have dysarthria. Patient A&Ox4, able to make needs known and using call light appropriately for assistance. Call light within reach; hourly rounding in place.      Problem: Safety  Goal: Will remain free from injury  Outcome: PROGRESSING AS EXPECTED  Note: Patient admitted for stroke work-up, encouraged to use call light prior to ambulating to avoid injury and falls. Patient expressing understanding and using call light appropriately. Bed locked and in lowest position, bed alarm on. Call light and belongings within reach. Hourly rounding in place.

## 2020-08-29 NOTE — THERAPY
Occupational Therapy   Initial Evaluation     Patient Name: Fawad Florentino  Age:  64 y.o., Sex:  male  Medical Record #: [id number]  Today's Date: 8/29/2020     Precautions  Precautions: Swallow Precautions ( See Comments)    Assessment  Patient is 64 y.o. male with with past medical history of hypertension who presented to the hospital 8/28/2020 with complaint of slurred speech for 4 days. Pt presents to OT eval appearing to be functioning at baseline for ADLs, ADL transfers, and functional mobility. Pt demonstrates good strength and balance during eval. Pt with slurred speech and slightly delayed responses, and reports numbness inside mouth. Pt reports he lives with ex-wife who will be able to assist as needed upon d/c.     Plan    Recommend Occupational Therapy for Evaluation only. Patient will not be actively followed for occupational therapy services at this time, however may be seen if requested by physician for 1 more visit within 30 days to address any discharge or equipment needs.     DC Equipment Recommendations: None  Discharge Recommendations: Anticipate that the patient will have no further occupational therapy needs after discharge from the hospital     Subjective    Pt alert, pleasant, and cooperative during OT eval.      Objective       08/29/20 0940   Prior Living Situation   Prior Services None;Home-Independent   Housing / Facility 1 Story House   Steps Into Home 0   Steps In Home 0   Bathroom Set up Walk In Shower;Bathtub / Shower Combination   Equipment Owned None   Lives with - Patient's Self Care Capacity Other (Comments)  (ex-wife)   Comments Pt lives with ex-wife and states she is able to assist as needed upon d/c.   Prior Level of ADL Function   Self Feeding Independent   Grooming / Hygiene Independent   Bathing Independent   Dressing Independent   Toileting Independent   Prior Level of IADL Function   Medication Management Independent   Laundry Independent   Kitchen Mobility Independent   Finances  Independent   Home Management Independent   Shopping Independent   Prior Level Of Mobility Independent Without Device in Community;Independent Without Device in Home   Driving / Transportation Driving Independent   Occupation (Pre-Hospital Vocational) Unable To Determine At This Time   Leisure Interests Exercise   Cognition    Cognition / Consciousness WDL   Speech/ Communication Delayed Responses;Dysarthric;Slurred   Level of Consciousness Alert   Comments Pleasant, cooperative, slurred speech, reports some numbness inside mouth on both sides   Balance Assessment   Comments no AD, no LOB   Bed Mobility    Supine to Sit Supervised   ADL Assessment   Grooming   (declined during eval, wanted to wait till after breakfast)   Lower Body Dressing Supervision  (socks)   Toileting Supervision   Functional Mobility   Sit to Stand Supervised   Toilet Transfers Supervised   Mobility in room/bathroom with no AD, distant spv   Activity Tolerance   Comments no reports, signs, or symptoms of fatigue   Patient / Family Goals   Patient / Family Goal #1 To get better

## 2020-08-29 NOTE — THERAPY
"Speech Language Pathology   Clinical Swallow Evaluation     Patient Name: Fawad Florentino  AGE:  64 y.o., SEX:  male  Medical Record #: [id number]  Today's Date: 8/28/2020     Precautions: (P) Swallow Precautions ( See Comments)    Assessment    Patient is 64 y.o. male with a diagnosis of dysarthria and HTN. CT of head:  No evidence of acute intracranial process. No current CXR - on room air. Additional factors influencing patient status/progress: dysarthria.    Patient was seen on this date for a clinical swallow evaluation. Patient AAOx4. Speech moderately dysarthric which pt stated started about 4 days ago and was significantly worse at that time. Pt denied difficulty swallow the last 4 days and currently (contrary to family report). Oral motor examination WNL. No gross asymmetry or weakness noted. Pt is edentulous and full upper and lower dentures already donned. PO trials were administered and consisted of ice chips, MTL, applesauce, pudding, soft solids, mixed consistencies, and thin liquids (12 oz). Onset of swallow trigger was timely and laryngeal elevation complete to palpation. Reflexive throat clear occurred following consecutive sips of thins x1. Otherwise, no overt s/sx of aspiration appreciated across all PO trials tested. Vocal quality and breath sounds remained clear. Pt reported mastication felt \"off\" but appeared functional for soft solids.     Plan    Recommend initiation of a soft & bite size (level 6) and thin liquid (level 0) diet given initial close monitoring. Please STOP PO with any concerns for aspiration and notify SLP.     Recommend Speech Therapy 3 times per week until therapy goals are met for the following treatments:  Dysphagia Training and Patient / Family / Caregiver Education.    Discharge Recommendations: Anticipate that the patient will have no further speech therapy needs after discharge from the hospital       Objective       08/28/20 1733   Vitals   O2 Delivery Device None - Room Air "   Oral Motor Eval    Is Patient Able to Complete Oral Motor Eval Yes, Within Normal Limits   Laryngeal Function   Voice Quality Within Functional Limits   Volutional Cough Within Functional Limits   Excursion Upon Swallow Complete   Oral Food Presentation   Ice Chips Within Functional Limits   Single Swallow Mildly Thick (2) - (Nectar Thick)  Within Functional Limits   Single Swallow Thin (0) Within Functional Limits   Serial Swallow Thin (0) Minimal   Liquidised (3) Within Functional Limits   Pureed (4) Within Functional Limits   Soft & Bite-Sized (6) - (Dysphagia III) Within Functional Limits   Regular (7) Minimal   Dysphagia Strategies / Recommendations   Compensatory Strategies Monitor During Meals;Head of Bed 90 Degrees During Eating / Drinking;Single Sips / Bites   Diet / Liquid Recommendation Soft & Bite-Sized (6) - (Dysphagia III);Mildly Thick (2) - (Nectar Thick)   Medication Administration  Whole with Liquid Wash   Therapy Interventions Dysphagia Therapy By Speech Language Pathologist;Pharyngeal / Laryngeal Exercises   Short Term Goals   Short Term Goal # 1 Patient will consume a SB6/TN0 diet with no overt s/sx of aspiration given min A for swallow strategies.

## 2020-08-29 NOTE — PROGRESS NOTES
Hospital Medicine Daily Progress Note    Date of Service  8/29/2020    Chief Complaint  64 y.o. male admitted 8/28/2020 with PMHx HTN poorly controlled with Metoprolol, HLD with Insurance lapse of 3 years/noncompliance c/o slurred speech for 4 days.    Hospital Course    64 y.o. male admitted 8/28/2020 with PMHx HTN poorly controlled with Metoprolol, HLD with Insurance lapse of 3 years/noncompliance c/o slurred speech for 4 days.    Patient was admitted with Hypertensive urgency r/o stroke. CT Head w/o contrast in ER did not show any acute abnormalities. Patient was started on Lisinopril and Labetalol prn with minimal improvement in blood pressure. Patient was started on Nifedipine 30mg XR.     MRI on 8/29/2020 demonstrated:  1.  Mild cerebral atrophy.  2.  Multiple old lacunar infarcts bilateral frontal deep white matter, bilateral basal ganglia.  3.  Acute lacunar size infarct in the left posterior limb internal capsule.  4.  Small oblong focus of old hemorrhagic infarction in the left posterior frontal periventricular white matter along the corona radiata just above the left thalamus.  5.  Multiple punctate foci of old microhemorrhage most consistent with old hypertensive microhemorrhage.  6.  Mild pontine ischemic gliosis. Tiny punctate focus of old lacunar size infarction in the left paramedian leila.  7.  Crescentic old lacunar size infarct in the left cerebellar hemisphere and tiny old lacunar infarct in the right cerebellar hemisphere.    Neurology was consulted for acute lacunar stroke and ASA 325mg was changed to DAPT for 21 days per POINT and CHANCE trials. Patient was evaluated as possible candidate for PSCK9. Patient was no        Interval Problem Update  Patient blood pressure medication regimen adjusted, f/u blood pressure with medical management  Patient was found to have acute Lacunar stroke on MRI with old infarcts also present. Neurology consulted, will f/u recs. Working with social work to address  insurance lapse needs and eligibility for medicare vs. Private insurance.    Consultants/Specialty  Neurology Dr. Herman.    Code Status  DNAR/DNI    Disposition  TBD.  Possible Needs: Social Work/Family support to help patient enroll in Insurance program as it has lapsed. Patient lives alone.      Review of Systems  Review of Systems   Constitutional: Negative for chills, diaphoresis, fever and weight loss.   HENT: Negative for ear discharge, ear pain and sore throat.    Eyes: Negative for blurred vision, double vision and pain.   Respiratory: Negative for cough, hemoptysis, sputum production and shortness of breath.    Cardiovascular: Negative for chest pain, palpitations and leg swelling.   Gastrointestinal: Negative for abdominal pain, heartburn, nausea and vomiting.   Genitourinary: Negative for dysuria.   Musculoskeletal: Negative for myalgias.   Skin: Negative for rash.   Neurological: Positive for speech change. Negative for dizziness, tingling, tremors, sensory change, seizures, loss of consciousness, weakness and headaches.   Psychiatric/Behavioral: Negative for substance abuse. The patient is not nervous/anxious.         Physical Exam  Temp:  [35.8 °C (96.5 °F)-36.6 °C (97.9 °F)] 36.6 °C (97.9 °F)  Pulse:  [63-73] 71  Resp:  [16-20] 18  BP: (175-201)/() 182/110  SpO2:  [93 %-97 %] 96 %    Physical Exam  Constitutional:       Appearance: Normal appearance.   HENT:      Head: Normocephalic and atraumatic.      Right Ear: External ear normal.      Left Ear: External ear normal.      Nose: Nose normal.      Mouth/Throat:      Mouth: Mucous membranes are moist.      Pharynx: No oropharyngeal exudate or posterior oropharyngeal erythema.   Eyes:      Extraocular Movements: Extraocular movements intact.      Pupils: Pupils are equal, round, and reactive to light.   Neck:      Musculoskeletal: Normal range of motion and neck supple.   Cardiovascular:      Rate and Rhythm: Normal rate and regular rhythm.       Pulses: Normal pulses.   Pulmonary:      Effort: Pulmonary effort is normal.      Breath sounds: Normal breath sounds.   Abdominal:      General: Abdomen is flat.      Palpations: Abdomen is soft.   Musculoskeletal: Normal range of motion.   Skin:     General: Skin is warm and dry.   Neurological:      Mental Status: He is alert and oriented to person, place, and time.      Coordination: Coordination normal.      Gait: Gait normal.      Comments: A&Ox4.  Slight psychomotor retardation with hands while eating  Slurred speech.  Patient ambulates without difficulty to bathroom  Slight delay in responses, but responses appropriate and measured, at one point laughing and stating he should get his insurance restarted now after hearing news of confirmed stroke.         Fluids    Intake/Output Summary (Last 24 hours) at 8/29/2020 1445  Last data filed at 8/28/2020 1846  Gross per 24 hour   Intake 120 ml   Output --   Net 120 ml       Laboratory  Recent Labs     08/28/20  1050 08/29/20  0352   WBC 5.7 5.7   RBC 5.61 4.68*   HEMOGLOBIN 16.4 14.0   HEMATOCRIT 47.8 39.4*   MCV 85.2 84.2   MCH 29.2 29.9   MCHC 34.3 35.5*   RDW 37.2 37.1   PLATELETCT 210 187   MPV 10.5 10.0     Recent Labs     08/28/20  1050 08/29/20  0352   SODIUM 139 139   POTASSIUM 3.5* 3.3*   CHLORIDE 103 107   CO2 20 21   GLUCOSE 105* 105*   BUN 14 12   CREATININE 0.83 0.70   CALCIUM 9.6 8.8     Recent Labs     08/28/20  1050   INR 0.98         Recent Labs     08/29/20  0352   TRIGLYCERIDE 109   HDL 37*   *       Imaging  MR-BRAIN-W/O   Final Result      1.  Mild cerebral atrophy.   2.  Multiple old lacunar infarcts bilateral frontal deep white matter, bilateral basal ganglia.   3.  Acute lacunar size infarct in the left posterior limb internal capsule.   4.  Small oblong focus of old hemorrhagic infarction in the left posterior frontal periventricular white matter along the corona radiata just above the left thalamus.   5.  Multiple punctate foci of  old microhemorrhage most consistent with old hypertensive microhemorrhage.   6.  Mild pontine ischemic gliosis. Tiny punctate focus of old lacunar size infarction in the left paramedian leila.   7.  Crescentic old lacunar size infarct in the left cerebellar hemisphere and tiny old lacunar infarct in the right cerebellar hemisphere.      US-CAROTID DOPPLER BILAT   Final Result      CT-HEAD W/O   Final Result      1.  No evidence of acute intracranial process.      2.  Moderate small vessel ischemic change.      EC-ECHOCARDIOGRAM COMPLETE W/O CONT    (Results Pending)        Assessment/Plan  * Acute lacunar stroke (HCC)  Assessment & Plan  -Diagnosed this Admission on MRI (Initial CT Head was negative in ER)  -4 days outside Therapeutic Window  -Was started on non-TPA stroke protocol order set.  -Hypertensive Urgency being managed medically with gradual reduction in BP to acceptable levels.  -On ASA  -Patient does not tolerate Statins (tried 3), has severe head to toe muscle cramping and pain.  >PT/OT evaluation  >Social Work Consulted to assist/guide patient towards restarting his insurance vs. Enrolling in medicaid.  >Neuro Checks q4hrs  >Cont Telemetry  >Ultrasound carotid and echocardiogram.  >Neurology Consulted today, f/u recs.  >Plavix 75mg/ASA 81mg RISHI started today      Hypertensive urgency- (present on admission)  Assessment & Plan  Patient will need insurance for medications for anticipated discharge. Patient was on Metoprolol in the past (3 years ago) which he states did not control his blood pressure adequately at the time.  -Systolic BP > 200 on presentation  -Started on Lisinopril 10mg on admission and titrated to 20mg and labetalol pushes marginally effective  >Begin Nifedipine 30mg XR and titrate  >Echo      DNR (do not resuscitate)  Assessment & Plan  CODE STATUS discussed with patient by admitting team. Patient elected to be DNR.        VTE prophylaxis: SCD's. Also on AC Plavix and ASA

## 2020-08-29 NOTE — CONSULTS
Neurology Initial Consult H&P  Neurohospitalist Service, [facility]    Referring Physician: Abiodun Leyva M.D.    Chief Complaint   Patient presents with   • Slurred Speech   • Hypertension       HPI: Fawad Florentino is a 64 y.o. male with history of uncontrolled hypertension and CAD s/p 2 stents who presented to [facility] ER on 8/28/20 for 4 day history of slurred speech. Neurology was consulted today after MRI brain wo contrast revealed acute ischemic stroke. Patient states that onset of slurred speech was on Monday, and by Tuesday he was unable to form words at all, when speech did no improve, he presented to ER. He states he delayed coming to ER because he does not have insurance.  Patient states that symptoms have been persistent since onset, now 5 days ago, with some improvement since hospitalization. Nothing seems to alleviate or exacerbate his symptoms. He denies headache, vision changes, weakness, numbness, tingling, dizziness, imbalance, loss of consciousness, or confusion.  Patient is aware of his high blood pressure, and supposed to be taking blood pressure medications, but has not been taking these for over a year secondary to insurance issues.    Past medical history:   Past Medical History:   Diagnosis Date   • CAD (coronary artery disease)    • Hypertension        Past surgical history:   Past Surgical History:   Procedure Laterality Date   • OTHER      tonsilectomy   • OTHER CARDIAC SURGERY      2 coronary stents 2002       Family history:   History reviewed. No pertinent family history.    Social history:   Social History     Socioeconomic History   • Marital status:      Spouse name: Not on file   • Number of children: Not on file   • Years of education: Not on file   • Highest education level: Not on file   Occupational History   • Not on file   Social Needs   • Financial resource strain: Not on file   • Food insecurity     Worry: Not on file     Inability: Not on file  "  • Transportation needs     Medical: Not on file     Non-medical: Not on file   Tobacco Use   • Smoking status: Former Smoker     Packs/day: 1.50     Years: 15.00     Pack years: 22.50     Types: Cigarettes     Start date:      Quit date:      Years since quittin.6   • Smokeless tobacco: Never Used   Substance and Sexual Activity   • Alcohol use: Not Currently     Comment: Quit drinking in ; previously drank from age of 12 until children were born, then from  to .  Heavy drinking including 1/5 tequila and 5-15 beers daily.    • Drug use: Not Currently     Types: Methamphetamines     Comment: Used \"crank\" in the past, sober since .    • Sexual activity: Not on file   Lifestyle   • Physical activity     Days per week: Not on file     Minutes per session: Not on file   • Stress: Not on file   Relationships   • Social connections     Talks on phone: Not on file     Gets together: Not on file     Attends Jewish service: Not on file     Active member of club or organization: Not on file     Attends meetings of clubs or organizations: Not on file     Relationship status: Not on file   • Intimate partner violence     Fear of current or ex partner: Not on file     Emotionally abused: Not on file     Physically abused: Not on file     Forced sexual activity: Not on file   Other Topics Concern   • Not on file   Social History Narrative   • Not on file       Allergies:  Allergies   Allergen Reactions   • Statins [Hmg-Coa-R Inhibitors] Myalgia       Medications:    Current Facility-Administered Medications:   •  potassium chloride SA (Kdur) tablet 20 mEq, 20 mEq, Oral, BID, Abiodun Leyva M.D., 20 mEq at 20 0917  •  NIFEdipine SR (PROCARDIA-XL) tablet 30 mg, 30 mg, Oral, Q DAY, Abiodun Leyva M.D., 30 mg at 20 1431  •  [START ON 2020] aspirin (ASA) chewable tab 81 mg, 81 mg, Oral, DAILY, Obdulio Herman M.D.  •  clopidogrel (PLAVIX) tablet 75 mg, 75 mg, Oral, DAILY, Obdulio" GIO Herman M.D., 75 mg at 08/29/20 1431  •  [START ON 8/30/2020] lisinopril (PRINIVIL) tablet 40 mg, 40 mg, Oral, Q DAY, Abiodun Leyva M.D.  •  hydrALAZINE (APRESOLINE) injection 10 mg, 10 mg, Intravenous, Q4HRS PRN, Abiodun Leyva M.D.  •  senna-docusate (PERICOLACE or SENOKOT S) 8.6-50 MG per tablet 2 Tab, 2 Tab, Oral, BID, 2 Tab at 08/29/20 0407 **AND** polyethylene glycol/lytes (MIRALAX) PACKET 1 Packet, 1 Packet, Oral, QDAY PRN **AND** magnesium hydroxide (MILK OF MAGNESIA) suspension 30 mL, 30 mL, Oral, QDAY PRN **AND** bisacodyl (DULCOLAX) suppository 10 mg, 10 mg, Rectal, QDAY PRN, Elisa Ackerman M.D.  •  acetaminophen (TYLENOL) tablet 650 mg, 650 mg, Oral, Q6HRS PRN, Elisa Ackerman M.D.  •  labetalol (NORMODYNE/TRANDATE) injection 10 mg, 10 mg, Intravenous, Q4HRS PRN, Elisa Ackerman M.D., 10 mg at 08/29/20 1043    Review of systems:   Constitutional: denies fever, night sweats, weight loss.   Eyes: denies eye pain or secretion.   Ears, Nose, Mouth, Throat: denies nasal secretion, nasal bleeding, difficulty swallowing, hearing loss, tinnitus, vertigo, ear pain, acute dental problems, oral ulcers or lesions.   Endocrine: denies recent weight changes, heat or cold intolerance, polyuria, polydypsia, polyphagia,abnormal hair growth.  Cardiovascular: denies new onset of chest pain, palpitations, syncope, or dyspnea of exertion.  Pulmonary: denies shortness of breath, new onset of cough, hemoptysis, wheezing, chest pain or flu-like symptoms.   GI: denies nausea, vomiting, diarrhea, GI bleeding, change in appetite, abdominal pain, and change in bowel habits.  : denies dysuria, urinary incontinence, hematuria.  Heme/oncology: denies history of easy bruising or bleeding. No history of cancer, DVTor PE.  Allergy/immunology: denies hives/urticaria, or itching.   Dermatologic: denies new rash, or new skin lesions.  Musculoskeletal:denies joint swelling or pain, muscle pain, neck and back  pain.   Neurologic: See HPI.   Psychiatric: denies symptoms of depression, anxiety, hallucinations, mood swings or changes, suicidal or homicidal thoughts.     Physical Examination:     Vitals:    08/29/20 0600 08/29/20 0637 08/29/20 0750 08/29/20 1150   BP: (!) 201/108  (!) 189/117 (!) 182/110   Pulse:  63 63 71   Resp:   16 18   Temp:   36.6 °C (97.9 °F) 36.6 °C (97.9 °F)   TempSrc:   Temporal Temporal   SpO2:   95% 96%   Weight:    101.6 kg (223 lb 15.8 oz)   Height:           General: Patient in no acute distress, pleasant and cooperative.  HEENT: Normocephalic, no signs of acute trauma.   Neck: supple, no meningeal signs or carotid bruits. There is normal range of motion. No tenderness on exam.   Chest: clear to auscultation. No cough.   CV: RRR, no murmurs.   Skin: no signs of acute rashes or trauma.   Musculoskeletal: joints exhibit full range of motion, without any pain to palpation. There are no signs of joint or muscle swelling. There is no tenderness to deep palpation of muscles.   Psychiatric: No hallucinatory behavior. Denies symptoms of depression or suicidal ideation. Mood and affect appear normal on exam.     NEUROLOGICAL EXAM:   Mental status, orientation: Awake, alert, fully oriented, and following commands.   Speech and language: Speech is mildly dysarthric but fluent. The patient is able to name, repeat, and comprehend.   Memory: There is intact recollection of recent and remote events.   Cranial nerve exam: Pupils are 3-4 mm bilaterally and equally reactive to light and accommodation. Visual fields assessment revealed quadrantanopia of left eye, right upper quad.  There is no nystagmus on primary or secondary gaze. Intact full EOM in all directions of gaze. Face appears asymmetric with right lower facial droop. Sensation in the face is intact to light touch. Uvula is midline. Palate elevates symmetrically. Tongue is midline and without any signs of tongue biting or fasciculations.  Sternocleidomastoid muscles exhibit is normal strength bilaterally. Shoulder shrug is intact bilaterally.   Motor exam: Strength is 5/5 in all extremities. Tone is normal. No abnormal movements were seen on exam.   Sensory exam Reveals normal sense of light touch and pinprick in all extremities.   Deep tendon reflexes:  2+ throughout. Plantar responses are flexor. There is no clonus.   Coordination: No ataxia; shows a normal finger-nose-finger and heel-down-shin. Normal rapidly alternating movements.   Gait: Deferred per patient request.     NIH Stroke Scale  8/29/2020    1a. Level of Consciousness (Alert, drowsy, etc): 0= Alert    1b. LOC Questions (Month, age): 0= Answers both correctly    1c. LOC Commands (Open/close eyes make fist/let go): 0= Obeys both correctly    2.   Best Gaze (Eyes open - patient follows examiner's finger on face): 0= Normal    3.   Visual Fields (introduce visual stimulus/threat to patient's field quadrants): 1= Partial Hemiania    4.   Facial Paresis (Show teeth, raise eyebrows and squeeze eyes shut): 1= Minor     5a. Motor Arm - Left (Elevate arm to 90 degrees if patient is sitting, 45 degrees if  supine): 0= No drift    5b. Motor Arm - Right (Elevate arm to 90 degrees if patient is sitting, 45 degrees if supine): 0= No drift    6a. Motor Leg - Left (Elevate leg 30 degrees with patient supine): 0= No drift    6b. Motor Leg - Right  (Elevate leg 30 degrees with patient supine): 0= No drift    7.   Limb Ataxia (Finger-nose, heel down shin): 0= No ataxia    8.   Sensory (Pin prick to face, arm, trunk and leg - compare side to side): 0= Normal    9.  Best Language (Name item, describe a picture and read sentences): 0= No aphasia    10. Dysarthria (Evaluate speech clarity by patient repeating listed words): 1= Mild to moderate slurring    11. Extinction and Inattention (Use information from prior testing to identify neglect or  double simultaneous stimuli testing): 0= No neglect    Total [facility]  Score: 3      Modified [place] Scale (MRS): 1 = No significant disability, despite symptoms; able to perform all usual duties and activities      Presumed Mechanism by TOAST:Small Vessel (Lacunar)    ANCILLARY DATA REVIEWED:     Labs:  Lab Results   Component Value Date/Time    PROTHROMBTM 13.2 08/28/2020 10:50 AM    INR 0.98 08/28/2020 10:50 AM      Lab Results   Component Value Date/Time    WBC 5.7 08/29/2020 03:52 AM    RBC 4.68 (L) 08/29/2020 03:52 AM    HEMOGLOBIN 14.0 08/29/2020 03:52 AM    HEMATOCRIT 39.4 (L) 08/29/2020 03:52 AM    MCV 84.2 08/29/2020 03:52 AM    MCH 29.9 08/29/2020 03:52 AM    MCHC 35.5 (H) 08/29/2020 03:52 AM    MPV 10.0 08/29/2020 03:52 AM    NEUTSPOLYS 56.50 08/29/2020 03:52 AM    LYMPHOCYTES 28.10 08/29/2020 03:52 AM    MONOCYTES 11.60 08/29/2020 03:52 AM    EOSINOPHILS 3.20 08/29/2020 03:52 AM    BASOPHILS 0.40 08/29/2020 03:52 AM      Lab Results   Component Value Date/Time    SODIUM 139 08/29/2020 03:52 AM    POTASSIUM 3.3 (L) 08/29/2020 03:52 AM    CHLORIDE 107 08/29/2020 03:52 AM    CO2 21 08/29/2020 03:52 AM    GLUCOSE 105 (H) 08/29/2020 03:52 AM    BUN 12 08/29/2020 03:52 AM    CREATININE 0.70 08/29/2020 03:52 AM      Lab Results   Component Value Date/Time    CHOLSTRLTOT 180 08/29/2020 03:52 AM     (H) 08/29/2020 03:52 AM    HDL 37 (A) 08/29/2020 03:52 AM    TRIGLYCERIDE 109 08/29/2020 03:52 AM       Lab Results   Component Value Date/Time    ALKPHOSPHAT 60 08/29/2020 03:52 AM    ASTSGOT 13 08/29/2020 03:52 AM    ALTSGPT 18 08/29/2020 03:52 AM    TBILIRUBIN 0.6 08/29/2020 03:52 AM        Imaging/Testing:    I interpreted and/or reviewed the patient's neuroimaging    MR-BRAIN-W/O   Final Result      1.  Mild cerebral atrophy.   2.  Multiple old lacunar infarcts bilateral frontal deep white matter, bilateral basal ganglia.   3.  Acute lacunar size infarct in the left posterior limb internal capsule.   4.  Small oblong focus of old hemorrhagic infarction in the left posterior  frontal periventricular white matter along the corona radiata just above the left thalamus.   5.  Multiple punctate foci of old microhemorrhage most consistent with old hypertensive microhemorrhage.   6.  Mild pontine ischemic gliosis. Tiny punctate focus of old lacunar size infarction in the left paramedian leila.   7.  Crescentic old lacunar size infarct in the left cerebellar hemisphere and tiny old lacunar infarct in the right cerebellar hemisphere.      US-CAROTID DOPPLER BILAT   Final Result      CT-HEAD W/O   Final Result      1.  No evidence of acute intracranial process.      2.  Moderate small vessel ischemic change.      EC-ECHOCARDIOGRAM COMPLETE W/O CONT    (Results Pending)         Assessment:    Fawad Florentino is a 64 y.o. male with relevant history of hypertension and CAD s/p 2 stents who presented to [facility] on 8/28/20 after 4 day history of slurred speech which Neurology was consulted to address acute ischemic stroke.  CT head without contrast 8/28/2020 revealed no acute intracranial process.  MRI brain without contrast 8/29/2020 revealed acute lacunar size infarct of the left posterior limb internal capsule; multiple chronic lacunar infarcts of bilateral frontal deep white matter, bilateral basal ganglia, left paramedian leila, and bilateral cerebellum; and small chronic hemorrhagic infarct of the left posterior frontal white matter just above the thalamus.  Bilateral carotid ultrasound revealed atherosclerotic plaque, left greater than right, with no significant stenosis. Patient states delay in seeking medical attention and also not taking his prescribed antihypertensive for over a year is secondary to insurance issues and financial restraints. Given history and exam, findings are consistent with acute ischemic stroke of the left internal capsule, with most likely etiology being small vessel disease from uncontrolled vascular risk factors of hypertension and hyperlipidemia.      Plan:    -q4h and PRN neuro assessment. VS per nursing/unit protocol. BP goal < 140/90 as patient presented more than 48 hours after symptom onset. Antihypertensives per primary team.   -Telemetry; currently SR. Screen for Afib/arrhythmia.   -Obtain TTE with bubble study- ordered and pending.   -DAPT with ASA 81 mg PO q day and Plavix 75mg PO q HS for 21 days, then monotherapy with ASA.  -Given allergy to statins with adverse effect of myalgias, patient is a candidate for PSCK9.    -Note lipid panel:  (goal <100) and HDL 37 (goal >40).   -Recommend aggressive BG management per primary team. Avoid IVF with Dextrose. BG goal 140-180. Note hemoglobin A1c: 5.7 (within goal <6.4).   -PT/OT/SLP eval and treat. See notes for recommendations, respectively.   -Counseled patient at length regarding life style and risk factor modification for secondary stroke prevention, including but not limited to signs and symptoms of stroke and need for emergency intervention if indicated, blood pressure management, cholesterol and glucose control, diet and exercise modifications, and medication compliance.   -Social work consulted and following for discharge planning with insurance and financial restrictions.  -Follow up outpatient at [facility]. Referral placed.   -All other medical management per primary team.   -DVT PPX: SCDs.       The evaluation of the patient, and recommended management, was discussed with Dr. Herman, Dr. Leyva, and Larry, bedside RN.     BEENA Pulido.   Nurse Practitioner, Neurohospitalist  [facility]  t) [phone]  (f) [phone]

## 2020-08-29 NOTE — DISCHARGE PLANNING
Dr. Leyva informed SW that patient has no insurance, will need medications and provider follow up.     SW called patient by phone for assessment. Patient has slurred speech over phone, but is easily understood.     Patient is a 64 year old single man who lives alone in [place], [place]. Patient receives [employer] benefits in the amount of $1475.00/month. Patient has no insurance, does not see doctors.     Patient is here for stroke like symptoms, brought by friend. Had slurred speech and other symptoms for 4-days before coming in. Per MD, patient will need ongoing medication, which insurance will be needed for. SW to follow up.     Sent Patient Financial Assistance email to request Medicaid Screening. Patient laughed over the phone and asked SW to help him get better and home again. Patient has no hx of drugs, alcohol, or mental health. No hx w/ [facility].     Care coordination to follow up with PFA and patient for d/c needs.     Care Transition Team Assessment    Information Source  Orientation : Oriented x 4  Who is responsible for making decisions for patient? : Patient    Readmission Evaluation  Is this a readmission?: No    Elopement Risk  Legal Hold: No  Ambulatory or Self Mobile in Wheelchair: Yes  Disoriented: No  Psychiatric Symptoms: None  History of Wandering: No  Elopement this Admit: No  Vocalizing Wanting to Leave: No  Displays Behaviors, Body Language Wanting to Leave: No-Not at Risk for Elopement  Elopement Risk: Not at Risk for Elopement    Interdisciplinary Discharge Planning  Lives with - Patient's Self Care Capacity: Other (Comments)(ex-wife)  Housing / Facility: [address] [facility]  Prior Services: None, Home-Independent    Discharge Preparedness  What is your plan after discharge?: Uncertain - pending medical team collaboration  Prior Functional Level: Independent with Activities of Daily Living, Independent with Medication Management    Functional Assesment  Prior Functional Level: Independent with Activities of  Daily Living, Independent with Medication Management    Finances  Financial Barriers to Discharge: No  Prescription Coverage: No  Prescription Coverage Comments: No insurance    Vision / Hearing Impairment  Right Eye Vision: Impaired, Wears Glasses  Left Eye Vision: Impaired, Wears Glasses         Advance Directive  Advance Directive?: POLST    Domestic Abuse  Have you ever been the victim of abuse or violence?: No    Psychological Assessment  History of Substance Abuse: None  History of Psychiatric Problems: No  Non-compliant with Treatment: Yes  Newly Diagnosed Illness: Yes    Discharge Risks or Barriers  Discharge risks or barriers?: Uninsured / underinsured, Lives alone, no community support, Complex medical needs, Non-adherence to medication or treatment  Patient risk factors: Cognitive / sensory / physical deficit, Lack of outside supports, Lives alone and no community support, Noncompliance, No PCP, Uninsured or underinsured, Vulnerable adult    Anticipated Discharge Information  Discharge Disposition: Still a Patient (30)  Discharge Contact Phone Number: [phone]

## 2020-08-30 VITALS
DIASTOLIC BLOOD PRESSURE: 97 MMHG | SYSTOLIC BLOOD PRESSURE: 123 MMHG | RESPIRATION RATE: 18 BRPM | BODY MASS INDEX: 33.18 KG/M2 | HEIGHT: 69 IN | HEART RATE: 80 BPM | TEMPERATURE: 97.9 F | OXYGEN SATURATION: 95 % | WEIGHT: 223.99 LBS

## 2020-08-30 PROBLEM — I16.0 HYPERTENSIVE URGENCY: Status: RESOLVED | Noted: 2020-08-28 | Resolved: 2020-08-30

## 2020-08-30 LAB — EKG IMPRESSION: NORMAL

## 2020-08-30 PROCEDURE — A9270 NON-COVERED ITEM OR SERVICE: HCPCS | Performed by: STUDENT IN AN ORGANIZED HEALTH CARE EDUCATION/TRAINING PROGRAM

## 2020-08-30 PROCEDURE — 700102 HCHG RX REV CODE 250 W/ 637 OVERRIDE(OP): Performed by: PSYCHIATRY & NEUROLOGY

## 2020-08-30 PROCEDURE — 93010 ELECTROCARDIOGRAM REPORT: CPT | Performed by: INTERNAL MEDICINE

## 2020-08-30 PROCEDURE — 99232 SBSQ HOSP IP/OBS MODERATE 35: CPT | Performed by: STUDENT IN AN ORGANIZED HEALTH CARE EDUCATION/TRAINING PROGRAM

## 2020-08-30 PROCEDURE — 97161 PT EVAL LOW COMPLEX 20 MIN: CPT

## 2020-08-30 PROCEDURE — 93005 ELECTROCARDIOGRAM TRACING: CPT | Performed by: STUDENT IN AN ORGANIZED HEALTH CARE EDUCATION/TRAINING PROGRAM

## 2020-08-30 PROCEDURE — A9270 NON-COVERED ITEM OR SERVICE: HCPCS | Performed by: PSYCHIATRY & NEUROLOGY

## 2020-08-30 PROCEDURE — 700102 HCHG RX REV CODE 250 W/ 637 OVERRIDE(OP): Performed by: STUDENT IN AN ORGANIZED HEALTH CARE EDUCATION/TRAINING PROGRAM

## 2020-08-30 RX ORDER — METOPROLOL SUCCINATE 25 MG/1
25 TABLET, EXTENDED RELEASE ORAL DAILY
Qty: 30 TAB | Refills: 0 | Status: SHIPPED | OUTPATIENT
Start: 2020-08-30 | End: 2020-09-14 | Stop reason: SDUPTHER

## 2020-08-30 RX ORDER — NIFEDIPINE 30 MG/1
30 TABLET, EXTENDED RELEASE ORAL ONCE
Status: COMPLETED | OUTPATIENT
Start: 2020-08-30 | End: 2020-08-30

## 2020-08-30 RX ORDER — LISINOPRIL 20 MG/1
20 TABLET ORAL
Status: DISCONTINUED | OUTPATIENT
Start: 2020-08-30 | End: 2020-08-30

## 2020-08-30 RX ORDER — LISINOPRIL 20 MG/1
20 TABLET ORAL ONCE
Status: COMPLETED | OUTPATIENT
Start: 2020-08-30 | End: 2020-08-30

## 2020-08-30 RX ORDER — ASPIRIN 81 MG/1
81 TABLET, CHEWABLE ORAL DAILY
Qty: 90 TAB | Refills: 3 | Status: SHIPPED | OUTPATIENT
Start: 2020-08-31 | End: 2020-09-14

## 2020-08-30 RX ORDER — CLONIDINE HYDROCHLORIDE 0.1 MG/1
0.1 TABLET ORAL TWICE DAILY
Status: DISCONTINUED | OUTPATIENT
Start: 2020-08-30 | End: 2020-08-30 | Stop reason: HOSPADM

## 2020-08-30 RX ORDER — HYDRALAZINE HYDROCHLORIDE 10 MG/1
10 TABLET, FILM COATED ORAL EVERY 8 HOURS
Status: DISCONTINUED | OUTPATIENT
Start: 2020-08-30 | End: 2020-08-30 | Stop reason: HOSPADM

## 2020-08-30 RX ORDER — NIFEDIPINE 60 MG/1
60 TABLET, EXTENDED RELEASE ORAL
Status: DISCONTINUED | OUTPATIENT
Start: 2020-08-31 | End: 2020-08-30 | Stop reason: HOSPADM

## 2020-08-30 RX ORDER — NIFEDIPINE 30 MG/1
30 TABLET, EXTENDED RELEASE ORAL
Status: DISCONTINUED | OUTPATIENT
Start: 2020-08-30 | End: 2020-08-30

## 2020-08-30 RX ORDER — LISINOPRIL 20 MG/1
40 TABLET ORAL
Status: DISCONTINUED | OUTPATIENT
Start: 2020-08-31 | End: 2020-08-30 | Stop reason: HOSPADM

## 2020-08-30 RX ORDER — CLONIDINE HYDROCHLORIDE 0.1 MG/1
0.1 TABLET ORAL 2 TIMES DAILY
Qty: 60 TAB | Refills: 0 | Status: SHIPPED | OUTPATIENT
Start: 2020-08-30 | End: 2020-09-14 | Stop reason: SDUPTHER

## 2020-08-30 RX ORDER — CLOPIDOGREL BISULFATE 75 MG/1
75 TABLET ORAL DAILY
Qty: 21 TAB | Refills: 0 | Status: SHIPPED | OUTPATIENT
Start: 2020-08-31 | End: 2020-09-14

## 2020-08-30 RX ORDER — LISINOPRIL 40 MG/1
40 TABLET ORAL DAILY
Qty: 90 TAB | Refills: 2 | Status: SHIPPED | OUTPATIENT
Start: 2020-08-31 | End: 2021-05-28

## 2020-08-30 RX ADMIN — ASPIRIN 81 MG: 81 TABLET, CHEWABLE ORAL at 04:57

## 2020-08-30 RX ADMIN — LISINOPRIL 20 MG: 20 TABLET ORAL at 09:11

## 2020-08-30 RX ADMIN — HYDRALAZINE HYDROCHLORIDE 25 MG: 25 TABLET, FILM COATED ORAL at 04:57

## 2020-08-30 RX ADMIN — HYDRALAZINE HYDROCHLORIDE 10 MG: 10 TABLET, FILM COATED ORAL at 13:23

## 2020-08-30 RX ADMIN — NIFEDIPINE 30 MG: 30 TABLET, EXTENDED RELEASE ORAL at 13:24

## 2020-08-30 RX ADMIN — NIFEDIPINE 30 MG: 30 TABLET, EXTENDED RELEASE ORAL at 09:33

## 2020-08-30 RX ADMIN — CLOPIDOGREL BISULFATE 75 MG: 75 TABLET ORAL at 04:57

## 2020-08-30 RX ADMIN — LISINOPRIL 20 MG: 20 TABLET ORAL at 04:58

## 2020-08-30 RX ADMIN — CLONIDINE HYDROCHLORIDE 0.1 MG: 0.1 TABLET ORAL at 12:23

## 2020-08-30 ASSESSMENT — LIFESTYLE VARIABLES: SUBSTANCE_ABUSE: 0

## 2020-08-30 ASSESSMENT — ENCOUNTER SYMPTOMS
WEIGHT LOSS: 0
SPEECH CHANGE: 1
HEMOPTYSIS: 0
BLURRED VISION: 0
SHORTNESS OF BREATH: 0
PALPITATIONS: 0
DIAPHORESIS: 0
HEARTBURN: 0
EYE PAIN: 0
SPUTUM PRODUCTION: 0
HEADACHES: 0
NERVOUS/ANXIOUS: 0
CHILLS: 0
WEAKNESS: 0
DIZZINESS: 0
TREMORS: 0
SENSORY CHANGE: 0
SORE THROAT: 0
LOSS OF CONSCIOUSNESS: 0
DOUBLE VISION: 0
NAUSEA: 0
SEIZURES: 0
COUGH: 0
FEVER: 0
VOMITING: 0
MYALGIAS: 0
ABDOMINAL PAIN: 0
TINGLING: 0

## 2020-08-30 ASSESSMENT — GAIT ASSESSMENTS
DEVIATION: DECREASED BASE OF SUPPORT
GAIT LEVEL OF ASSIST: SUPERVISED
DISTANCE (FEET): 200

## 2020-08-30 ASSESSMENT — COGNITIVE AND FUNCTIONAL STATUS - GENERAL
CLIMB 3 TO 5 STEPS WITH RAILING: A LITTLE
TURNING FROM BACK TO SIDE WHILE IN FLAT BAD: A LITTLE
MOBILITY SCORE: 18
MOVING TO AND FROM BED TO CHAIR: A LITTLE
WALKING IN HOSPITAL ROOM: A LITTLE
MOVING FROM LYING ON BACK TO SITTING ON SIDE OF FLAT BED: A LITTLE
STANDING UP FROM CHAIR USING ARMS: A LITTLE
SUGGESTED CMS G CODE MODIFIER MOBILITY: CK

## 2020-08-30 NOTE — PROGRESS NOTES
Hospital Medicine Daily Progress Note    Date of Service  8/30/2020    Chief Complaint  64 y.o. male admitted 8/28/2020 with PMHx HTN poorly controlled with Metoprolol, HLD with Insurance lapse of 3 years/noncompliance c/o slurred speech for 4 days.    Hospital Course    64 y.o. male admitted 8/28/2020 with PMHx HTN poorly controlled with Metoprolol, HLD with Insurance lapse of 3 years/noncompliance c/o slurred speech for 4 days.    Patient was admitted with Hypertensive urgency r/o stroke. CT Head w/o contrast in ER did not show any acute abnormalities. Patient was started on Lisinopril and Labetalol prn with minimal improvement in blood pressure. Patient was started on Nifedipine 30mg XR.     MRI on 8/29/2020 demonstrated:  1.  Mild cerebral atrophy.  2.  Multiple old lacunar infarcts bilateral frontal deep white matter, bilateral basal ganglia.  3.  Acute lacunar size infarct in the left posterior limb internal capsule.  4.  Small oblong focus of old hemorrhagic infarction in the left posterior frontal periventricular white matter along the corona radiata just above the left thalamus.  5.  Multiple punctate foci of old microhemorrhage most consistent with old hypertensive microhemorrhage.  6.  Mild pontine ischemic gliosis. Tiny punctate focus of old lacunar size infarction in the left paramedian leila.  7.  Crescentic old lacunar size infarct in the left cerebellar hemisphere and tiny old lacunar infarct in the right cerebellar hemisphere.    Neurology was consulted for acute lacunar stroke and ASA 325mg was changed to DAPT for 21 days per POINT and CHANCE trials. Patient was evaluated as possible candidate for PSCK9.         Interval Problem Update  Hypertensive Urgency resolved, on 3 medication regimen, will titrate to eliminate hydralazine, if possible today, as patient tends to be non-compliant with medication and maintaining medical insurance. Will attempt to have all medications be daily.     Working with  "social work to address insurance lapse needs and eligibility for medicare vs. Private insurance. Patient lives off social security income of 1475/month and will need to sign up for insurance for his medications.     Pending Echo w/ bubble study.   Referral for [facility] in place, however patient states he \"just wants to go home, no therapy.\" PT: Home with no needs.    Consultants/Specialty  Neurology Dr. Herman.    Code Status  DNAR/DNI    Disposition  TBD.  Possible Needs: Social Work/Family support to help patient enroll in Insurance program as it has lapsed. Patient lives alone.      Review of Systems  Review of Systems   Constitutional: Negative for chills, diaphoresis, fever and weight loss.   HENT: Negative for ear discharge, ear pain and sore throat.    Eyes: Negative for blurred vision, double vision and pain.   Respiratory: Negative for cough, hemoptysis, sputum production and shortness of breath.    Cardiovascular: Negative for chest pain, palpitations and leg swelling.   Gastrointestinal: Negative for abdominal pain, heartburn, nausea and vomiting.   Genitourinary: Negative for dysuria.   Musculoskeletal: Negative for myalgias.   Skin: Negative for rash.   Neurological: Positive for speech change. Negative for dizziness, tingling, tremors, sensory change, seizures, loss of consciousness, weakness and headaches.   Psychiatric/Behavioral: Negative for substance abuse. The patient is not nervous/anxious.         Physical Exam  Temp:  [36.3 °C (97.3 °F)-36.7 °C (98.1 °F)] 36.3 °C (97.3 °F)  Pulse:  [68-75] 73  Resp:  [16-18] 16  BP: (144-182)/() 161/95  SpO2:  [95 %-98 %] 97 %    Physical Exam  Constitutional:       Appearance: Normal appearance.   HENT:      Head: Normocephalic and atraumatic.      Right Ear: External ear normal.      Left Ear: External ear normal.      Nose: Nose normal.      Mouth/Throat:      Mouth: Mucous membranes are moist.      Pharynx: No oropharyngeal exudate or " posterior oropharyngeal erythema.   Eyes:      Extraocular Movements: Extraocular movements intact.      Pupils: Pupils are equal, round, and reactive to light.   Neck:      Musculoskeletal: Normal range of motion and neck supple.   Cardiovascular:      Rate and Rhythm: Normal rate and regular rhythm.      Pulses: Normal pulses.   Pulmonary:      Effort: Pulmonary effort is normal.      Breath sounds: Normal breath sounds.   Abdominal:      General: Abdomen is flat.      Palpations: Abdomen is soft.   Musculoskeletal: Normal range of motion.   Skin:     General: Skin is warm and dry.   Neurological:      Mental Status: He is alert and oriented to person, place, and time.      Coordination: Coordination normal.      Gait: Gait normal.      Comments: A&Ox4.  Slight psychomotor retardation with hands while eating  Slurred speech.  Patient ambulates without difficulty to bathroom  Slight delay in responses, but responses appropriate and measured, at one point laughing and stating he should get his insurance restarted now after hearing news of confirmed stroke.         Fluids    Intake/Output Summary (Last 24 hours) at 8/30/2020 0852  Last data filed at 8/29/2020 1750  Gross per 24 hour   Intake 860 ml   Output --   Net 860 ml       Laboratory  Recent Labs     08/28/20  1050 08/29/20  0352   WBC 5.7 5.7   RBC 5.61 4.68*   HEMOGLOBIN 16.4 14.0   HEMATOCRIT 47.8 39.4*   MCV 85.2 84.2   MCH 29.2 29.9   MCHC 34.3 35.5*   RDW 37.2 37.1   PLATELETCT 210 187   MPV 10.5 10.0     Recent Labs     08/28/20  1050 08/29/20  0352   SODIUM 139 139   POTASSIUM 3.5* 3.3*   CHLORIDE 103 107   CO2 20 21   GLUCOSE 105* 105*   BUN 14 12   CREATININE 0.83 0.70   CALCIUM 9.6 8.8     Recent Labs     08/28/20  1050   INR 0.98         Recent Labs     08/29/20  0352   TRIGLYCERIDE 109   HDL 37*   *       Imaging  EC-ECHOCARDIOGRAM COMPLETE W/O CONT   Final Result      MR-BRAIN-W/O   Final Result      1.  Mild cerebral atrophy.   2.   Multiple old lacunar infarcts bilateral frontal deep white matter, bilateral basal ganglia.   3.  Acute lacunar size infarct in the left posterior limb internal capsule.   4.  Small oblong focus of old hemorrhagic infarction in the left posterior frontal periventricular white matter along the corona radiata just above the left thalamus.   5.  Multiple punctate foci of old microhemorrhage most consistent with old hypertensive microhemorrhage.   6.  Mild pontine ischemic gliosis. Tiny punctate focus of old lacunar size infarction in the left paramedian leila.   7.  Crescentic old lacunar size infarct in the left cerebellar hemisphere and tiny old lacunar infarct in the right cerebellar hemisphere.      US-CAROTID DOPPLER BILAT   Final Result      CT-HEAD W/O   Final Result      1.  No evidence of acute intracranial process.      2.  Moderate small vessel ischemic change.           Assessment/Plan  * Acute lacunar stroke (HCC)  Assessment & Plan  -Diagnosed this Admission on MRI (Initial CT Head was negative in ER)  -4 days outside Therapeutic Window  -Was started on non-TPA stroke protocol order set.  -Hypertensive Urgency managed  -Patient does not tolerate Statins (tried 3), has severe head to toe muscle cramping and pain.  -PT/OT/SLP on board  >Social Work Consulted to assist/guide patient towards restarting his insurance vs. Enrolling in medicaid.  >PSCK9-I' on discharge pending insurance.  >Neuro Checks q4hrs  >Cont Telemetry, r/o afib  >Ultrasound carotid and echocardiogram pending.  >f/u Neuro recs appreciated.  >Plavix 75mg/ASA 81mg RISHI  >Close BP management      Hypertensive urgency- (present on admission)  Assessment & Plan  Patient will need insurance for medications for anticipated discharge. Patient is noncompliant, therefore attempt to have all meds be once/day underway.  -Systolic BP > 200 on presentation, now in 140's (went to 160s after ambulating with PT)  -Possible Hx of Afib, patient was on  metoprolol in the past, to restart pending verification.  -Echo EF 50: Normal chamber size, systolic, and diastolic function. No R>L shunt on bubble study.  >Increase Nifedipine to 60mg XR daily today  >Increase Lisinopril to 40mg daily today  >Attempt to Taper Hydralazine 25 q8hrs as patient likely noncompliant with this regimen.  >Will d/c Labetalol on d/c, for breakthrough HTN only.          DNR (do not resuscitate)  Assessment & Plan  CODE STATUS discussed with patient by admitting team. Patient elected to be DNR.        VTE prophylaxis: SCD's. Also on AC Plavix and ASA

## 2020-08-30 NOTE — PROGRESS NOTES
Monitor summary: SR 62-90, OH .20, QRS .08, QT .36, rare PVCs and rare PACs per strip from monitor room.

## 2020-08-30 NOTE — PROGRESS NOTES
Pt discharged home with ojse. Discharge education provided to pt. Pt verbalized understanding of education given. PIV removed, dressing applied. All belongings accounted for at time of discharge.

## 2020-08-30 NOTE — PROGRESS NOTES
Monitor Summary: SB 58 - SR 82, KS -.20, QRS -.08, QT -.40, with frequent PVC's, rare couplets, and rare PAC's per strip from monitor room.

## 2020-08-30 NOTE — THERAPY
Physical Therapy   Initial Evaluation     Patient Name: Fawad Florentino  Age:  64 y.o., Sex:  male  Medical Record #: [id number]  Today's Date: 8/30/2020     Precautions: Swallow Precautions (SBP <140)    Assessment  Patient is 64 y.o. male presenting to PT with a dx of slurred speech 2/2 acute ischemic stroke. Pt with PMH of uncontrolled HTN and CAD. Upon arrival, pt expressing that his speech has improved since his admission. Pt's BLE strength, ROM, and sensation are WDL and patient's overall functional mobility appears to be near his baseline. Pt is able to perform bed mobility, transfers, gait, and stairs with SPV. No significant deviations or overt LOB noted with ambulation or stairs. At the end of the eval, pt's blood pressure elevated to 175/99 with physical activity. Pt was educated to monitor BP at home and to avoid holding his breath/performing valsalva maneuvers when performing strenuous activities after DC. At this time, pt appears to be near baseline and anticipate no further PT needs while in house and after DC.      Plan    Recommend Physical Therapy for Evaluation only.   Patient will not be actively followed for physical therapy services at this time, however may be seen if requested by physician for 1 more visit within 30 days to address any discharge or equipment needs     DC Equipment Recommendations: None  Discharge Recommendations: Anticipate that the patient will have no further physical therapy needs after discharge from the hospital        Objective       08/30/20 0816   Prior Living Situation   Prior Services None;Home-Independent   Housing / Facility 1 Story House   Steps Into Home 2   Steps In Home 0   Equipment Owned None   Lives with - Patient's Self Care Capacity Other (Comments)  (pt lives with ex-wife)   Comments endorses that ex-wife is able to help at home   Prior Level of Functional Mobility   Bed Mobility Independent   Transfer Status Independent   Ambulation Independent   Distance  Ambulation (Feet)   (community distances)   Assistive Devices Used None   Stairs Independent   Cognition    Speech/ Communication Delayed Responses;Dysarthric;Slurred   Level of Consciousness Alert   Comments pt is pleasant and cooperative with eval   Passive ROM Lower Body   Passive ROM Lower Body WDL   Active ROM Lower Body    Active ROM Lower Body  WDL   Strength Lower Body   Lower Body Strength  WDL   Sensation Lower Body   Lower Extremity Sensation   WDL   Balance Assessment   Sitting Balance (Static) Good   Sitting Balance (Dynamic) Fair +   Standing Balance (Static) Fair +   Standing Balance (Dynamic) Fair   Comments no AD, no overt LOB noted with gait   Gait Analysis   Gait Level Of Assist Supervised   Assistive Device None   Distance (Feet) 200   # of Times Distance was Traveled 1   Deviation Decreased Base Of Support  (no significant deviations noted)   Bed Mobility    Supine to Sit Supervised   Sit to Supine Supervised   Functional Mobility   Bed, Chair, Wheelchair Transfer Supervised   Mobility around unit, no AD, SPV   Anticipated Discharge Equipment and Recommendations   DC Equipment Recommendations None   Discharge Recommendations Anticipate that the patient will have no further physical therapy needs after discharge from the hospital

## 2020-08-30 NOTE — DISCHARGE INSTRUCTIONS
>Please follow up with a primary care physician to optimize your blood pressure with medications to prevent recurrence of Lacunar Strokes.  >Take Plavix for 21 days to prevent recurrence of stroke and stabilize arterial plaques  >Continue Baby aspirin 81mg indefinitely  >please be compliant with Blood pressure medications  >obtain blood pressure machine at home and self-monitor. Go to ED or urgent care if blood pressure goes above 180/110, and seek out your physician soon if blood pressure over 160/100  >Goal Blood Pressure for your age and circumstances is at least 140/80 or less, with optimization preferable to normal ranges.  >Follow up with Cardiologist outpatient.    Discharge Instructions    Discharged to home by car with friend. Discharged via wheelchair, hospital escort: Yes.  Special equipment needed: Not Applicable    Be sure to schedule a follow-up appointment with your primary care doctor or any specialists as instructed.     Discharge Plan:   Diet Plan: Discussed  Activity Level: Discussed  Confirmed Follow up Appointment: Patient to Call and Schedule Appointment  Confirmed Symptoms Management: Discussed  Medication Reconciliation Updated: Yes    I understand that a diet low in cholesterol, fat, and sodium is recommended for good health. Unless I have been given specific instructions below for another diet, I accept this instruction as my diet prescription.   Other diet: 2 gram Na diet    Special Instructions:     Stroke/CVA/TIA/Hemorrhagic Ischemia Discharge Instructions  You have had a stroke. Your risk factors have been identified as follows:  Age - Over 55  Gender - Men are at a higher risk than women  High blood pressure  Heart disease  High Cholesterol and lipids  It is important that you reduce your risk factors to avoid another stroke in the future. Here are some general guidelines to follow:  · Eat healthy - avoid food high in fat.  · Get regular exercise.  · Maintain a healthy weight.  · Avoid  smoking.  · Avoid alcohol and illegal drug use.  · Take your medications as directed.  For more information regarding risk factors, refer to pages 17-19 in your Stroke Patient Education Guide. Stroke Education Guide was given to patient.    Warning signs of a stroke include (which can also be found on page 3 of your Stroke Patient Education Guide):  · Sudden numbness of weakness of the face, arm or leg (especially on one side of the body).  · Sudden confusion, trouble speaking or understanding.  · Sudden trouble seeing in one or both eyes.  · Sudden trouble walking, dizziness, loss of balance or coordination.  · Sudden severe headache with no known cause.  It is very important to get treatment quickly when a stroke occurs. If you experience any of the above warning signs, call 911 immediately.     Some patients who have had a stroke will be going home on a blood thinner medication called Warfarin (Coumadin).  This medication requires very close monitoring and follow up.  This follow up can be provided by either your Primary Care Physician or by [facility]'s Outpatient Anticoagulation Service.  The Outpatient Anticoagulation Service is located at the [facility] at [facility] ([facility]).  If you do not know when your follow up appointment is scheduled, call [phone] to verify your appointment time.      · Is patient discharged on Warfarin / Coumadin?   No     Depression / Suicide Risk    As you are discharged from this [facility], it is important to learn how to keep safe from harming yourself.    Recognize the warning signs:  · Abrupt changes in personality, positive or negative- including increase in energy   · Giving away possessions  · Change in eating patterns- significant weight changes-  positive or negative  · Change in sleeping patterns- unable to sleep or sleeping all the time   · Unwillingness or inability to  communicate  · Depression  · Unusual sadness, discouragement and loneliness  · Talk of wanting to die  · Neglect of personal appearance   · Rebelliousness- reckless behavior  · Withdrawal from people/activities they love  · Confusion- inability to concentrate     If you or a loved one observes any of these behaviors or has concerns about self-harm, here's what you can do:  · Talk about it- your feelings and reasons for harming yourself  · Remove any means that you might use to hurt yourself (examples: pills, rope, extension cords, firearm)  · Get professional help from the community (Mental Health, Substance Abuse, psychological counseling)  · Do not be alone:Call your Safe Contact- someone whom you trust who will be there for you.  · Call your local CRISIS HOTLINE 622-4661 or 236-814-3417  · Call your local Children's Mobile Crisis Response Team [place] (292) 758-8315 or www.[facility]  · Call the toll free National Suicide Prevention Hotlines   · National Suicide Prevention Lifeline 241-362-QSVE (5999)  · National Hope Line Network 800-SUICIDE (463-1539)      Lacunar Stroke    A stroke is the sudden death of brain tissue that occurs when an area of the brain does not get enough oxygen. A lacunar stroke (lacunar infarction) is caused by a blockage in one of the small arteries deep in the brain.  Lacunar stroke is a medical emergency that must be treated right away.It is important to get help right away as soon as you notice symptoms of stroke.  What are the causes?  A lacunar stroke occurs when small arteries deep in the brain become more narrow due to a buildup of fatty deposits in the arteries (atherosclerosis). When the arteries narrow, less blood flows to certain areas of the brain. Without enough blood and oxygen, these parts of the brain can die or become permanently damaged.  What increases the risk?  You are more likely to develop this condition if:  · You have high blood pressure  (hypertension).  · You have high cholesterol (hyperlipidemia).  · You smoke.  · You have diabetes.  · You are obese.  · You have an unhealthy diet. This includes foods that are high in saturated fat, trans fat, and salt (sodium).  · You have a heart rhythm disorder (atrial fibrillation).  · You have heart disease.  · You have artery disease, such as carotid artery disease or peripheral artery disease.  · You have sickle cell disease.  · You are age 60 or older.  · You have a personal or family history of stroke.  · You are -American.  · You are a woman who:  ? Is pregnant.  ? Has a history of diabetes during pregnancy (gestational diabetes).  ? Has a history of preeclampsia or eclampsia.  ? Has had hormone therapy after menopause.  ? Uses oral birth control (contraception), especially while smoking.  What are the signs or symptoms?  Symptoms of this condition usually develop suddenly. They may include:  · Weakness or numbness in your face, arm, or leg, especially on one side of your body.  · Trouble walking or difficulty moving your arms or legs.  · Loss of balance or coordination.  · Confusion.  · Slurred speech (dysarthria).  · Trouble speaking, understanding speech, or both (aphasia).  · Vision problems in one or both eyes.  · Dizziness.  · Nausea and vomiting.  · Severe headache with no known cause.  How is this diagnosed?  This condition may be diagnosed based on:  · Your symptoms and medical history.  · A physical exam.  · Blood tests.  · A CT scan of the brain.  · MRI.  · Ultrasound of an artery. This may help find blood flow problems or blockages.  · Angiogram. During this test, dye is injected into your blood and then an X-ray is done to look for blockages. The dye helps blood flow and blockages show up clearly on X-rays.  · Electroencephalogram (EEG). This test checks electrical activity in the brain.  How is this treated?  This condition must be treated within 4.5 hours of the start of the stroke.  It is treated with IV medicine that dissolves the blood clot (tissue plasminogen activator, TPA) that is causing the blockage. The goal is to restore blood flow to the brain as soon as possible.  Your health care provider may prescribe blood thinners (antiplatelets or anticoagulants) to lower your risk of another stroke.  Follow these instructions at home:  Medicines  · Take over-the-counter and other prescription medicines only as told by your health care provider. This includes diabetes or cholesterol medicine.  · If you were told to take a medicine to thin your blood, such as aspirin, take it exactly as told by your health care provider.  ? Taking too much blood-thinning medicine can cause bleeding.  ? If you do not take enough blood-thinning medicine, you will not have the protection that you need against another stroke and other problems.  Eating and drinking  · Follow instructions from your health care provider about eating and drinking restrictions.  · Eat a healthy diet. This includes plenty of fruits and vegetables, lean meats, whole grains, and low-fat dairy products.  · Avoid foods high in saturated fat, trans fat, or sodium.  · Limit alcohol intake to no more than 1 drink a day for nonpregnant women and 2 drinks a day for men. One drink equals 12 oz of beer, 5 oz of wine, or 1½ oz of hard liquor.  Safety  · If you need help walking, use a cane or walker as told by your health care provider.  · Take steps to lower the risk of falls in your home. This may include:  ? Using safety equipment, such as raised toilets and a seat in the shower.  ? Removing clutter and tripping hazards from walkways, such as cords or rugs.  ? Installing grab bars in the bedroom and bathroom.  Activity  · Exercise regularly, as told by your health care provider.  · Take part in rehabilitation programs as told by your health care provider. This may include physical therapy, occupational therapy, or speech therapy.  General  "instructions  · Do not use any tobacco products, such as cigarettes, chewing tobacco, and e-cigarettes. If you need help quitting, ask your health care provider.  · Keep all follow-up visits as told by your health care provider. This is important.  Get help right away if:    · You have any symptoms of stroke. \"BE FAST\" is an easy way to remember the main warning signs of stroke:  ? B - Balance. Signs are dizziness, sudden trouble walking, or loss of balance.  ? E - Eyes. Signs are trouble seeing or a sudden change in vision.  ? F - Face. Signs are sudden weakness or numbness of the face, or the face or eyelid drooping on one side.  ? A - Arms. Signs are weakness or numbness in an arm. This happens suddenly and usually on one side of the body.  ? S - Speech. Signs are sudden trouble speaking, slurred speech, or trouble understanding what people say.  ? T - Time. Time to call emergency services. Write down what time symptoms started.  · You have other signs of stroke, such as:  ? A sudden, severe headache with no known cause.  ? Nausea or vomiting.  ? Seizure.  · You have a severe fall or injury.  These symptoms may represent a serious problem that is an emergency. Do not wait to see if the symptoms will go away. Get medical help right away. Call your local emergency services (911 in the [place] Do not drive yourself to the hospital.  Summary  · A lacunar stroke (lacunar infarction) is a blockage of one of the small arteries deep in the brain. When one of these arteries is blocked, parts of the brain do not get enough oxygen and may die.  · This condition is a medical emergency that must be treated right away. Treatments must be done within 4.5 hours of the start of the stroke.  · Controlling your risk factors for stroke is the best way to avoid another lacunar stroke.  · Get help right away if you have any symptoms of stroke. \"BE FAST\" is an easy way to remember the main warning signs of stroke.  This information is " not intended to replace advice given to you by your health care provider. Make sure you discuss any questions you have with your health care provider.  Document Released: 05/04/2018 Document Revised: 09/06/2019 Document Reviewed: 05/04/2018  Elsevier Patient Education © 2020 [employer]

## 2020-08-30 NOTE — CARE PLAN
Problem: Communication  Goal: The ability to communicate needs accurately and effectively will improve  Outcome: PROGRESSING AS EXPECTED  Note: Here for stroke. Patient is able to verbalize needs and all needs are met by staff. Patient calm and corporative with care. Patient uses call light appropriately and call light is within reach.      Problem: Safety  Goal: Will remain free from falls  Outcome: PROGRESSING AS EXPECTED  Note: Here for stroke. Bed is locked in lowest position with bed alarm on. Upper side rails up. Bed alarm is on. Personal belongings and call light is within reach. Uses call light appropriately.

## 2020-09-14 ENCOUNTER — OFFICE VISIT (OUTPATIENT)
Dept: MEDICAL GROUP | Facility: MEDICAL CENTER | Age: 65
End: 2020-09-14

## 2020-09-14 VITALS
DIASTOLIC BLOOD PRESSURE: 80 MMHG | BODY MASS INDEX: 34.66 KG/M2 | TEMPERATURE: 99.1 F | SYSTOLIC BLOOD PRESSURE: 142 MMHG | WEIGHT: 234 LBS | HEIGHT: 69 IN | OXYGEN SATURATION: 97 % | HEART RATE: 77 BPM

## 2020-09-14 DIAGNOSIS — I25.10 CORONARY ARTERY DISEASE INVOLVING NATIVE CORONARY ARTERY OF NATIVE HEART WITHOUT ANGINA PECTORIS: ICD-10-CM

## 2020-09-14 DIAGNOSIS — Z11.59 NEED FOR HEPATITIS C SCREENING TEST: ICD-10-CM

## 2020-09-14 DIAGNOSIS — I10 ESSENTIAL HYPERTENSION: ICD-10-CM

## 2020-09-14 DIAGNOSIS — I63.81 ACUTE LACUNAR STROKE (HCC): ICD-10-CM

## 2020-09-14 DIAGNOSIS — E78.5 DYSLIPIDEMIA: ICD-10-CM

## 2020-09-14 DIAGNOSIS — R73.01 IMPAIRED FASTING GLUCOSE: ICD-10-CM

## 2020-09-14 DIAGNOSIS — Z12.5 SCREENING FOR PROSTATE CANCER: ICD-10-CM

## 2020-09-14 PROCEDURE — 99203 OFFICE O/P NEW LOW 30 MIN: CPT | Performed by: NURSE PRACTITIONER

## 2020-09-14 RX ORDER — METOPROLOL SUCCINATE 25 MG/1
25 TABLET, EXTENDED RELEASE ORAL DAILY
Qty: 90 TAB | Refills: 1 | Status: SHIPPED | OUTPATIENT
Start: 2020-09-14 | End: 2020-09-18 | Stop reason: SDUPTHER

## 2020-09-14 RX ORDER — CLONIDINE HYDROCHLORIDE 0.1 MG/1
0.1 TABLET ORAL 2 TIMES DAILY
Qty: 180 TAB | Refills: 1 | Status: SHIPPED | OUTPATIENT
Start: 2020-09-14 | End: 2020-10-14

## 2020-09-14 ASSESSMENT — ENCOUNTER SYMPTOMS: SPEECH CHANGE: 1

## 2020-09-14 ASSESSMENT — PATIENT HEALTH QUESTIONNAIRE - PHQ9: CLINICAL INTERPRETATION OF PHQ2 SCORE: 0

## 2020-09-14 ASSESSMENT — FIBROSIS 4 INDEX: FIB4 SCORE: 1.07

## 2020-09-14 NOTE — PROGRESS NOTES
Subjective:      Fawad Florentino is a 65 y.o. male who presents with Establish Care (Kathy)        CC: Patient is here today accompanied by his wife to establish care post stroke.    HPI         1. Acute lacunar stroke (HCC)  Patient apparently has history of uncontrolled hypertension and poor medical follow-up due to lack of insurance.  He started having issues with his speech for about 3 days prior to going the emergency room on 8/28/2020.  Patient was found to have multiple old lacunar infarcts as well as an acute lacunar size infarct, and Cresent old lacunar size infarct in the left cerebellar hemisphere.    Patient was seen by neurology and he was put on Plavix for 21 days and then to switch to aspirin.  His blood pressure was brought under control with a combination of nifedipine, clonidine, metoprolol and lisinopril.  He could not tolerate statins so he was given a one-time Repatha injection.  He does not have an appointment as of yet for follow-up with cardiology or the [facility].  He reports no unilateral weakness, vision changes, confusion but does have speech issues.    2. Impaired fasting glucose  Blood work from the hospital showed mild elevation in hemoglobin A1c at 5.7.    3. Essential hypertension  Blood pressure today in the office borderline elevated despite his medications which he states he is using.    4. Dyslipidemia  Patient's cholesterol levels mildly elevated and he states he cannot tolerate statins in the past and subsequently was off all medications.  He states he cannot afford Repatha especially without any insurance.    5. Coronary artery disease involving native coronary artery of native heart without angina pectoris  Patient reports that he had a stent placed approximately in 2003 and has not seen a cardiologist in many years.  His echocardiogram showed no significant valvular disease.  He is finishing his Plavix and going to aspirin.    6. Need for hepatitis C screening  "test  Patient due for screening    7. Screening for prostate cancer  Patient due for screening  Past Medical History:   Diagnosis Date   • CAD (coronary artery disease)    • Hypertension      Social History     Socioeconomic History   • Marital status:      Spouse name: Not on file   • Number of children: Not on file   • Years of education: Not on file   • Highest education level: Not on file   Occupational History   • Not on file   Social Needs   • Financial resource strain: Not on file   • Food insecurity     Worry: Not on file     Inability: Not on file   • Transportation needs     Medical: Not on file     Non-medical: Not on file   Tobacco Use   • Smoking status: Former Smoker     Packs/day: 1.50     Years: 15.00     Pack years: 22.50     Types: Cigarettes     Start date:      Quit date:      Years since quittin.7   • Smokeless tobacco: Never Used   Substance and Sexual Activity   • Alcohol use: Not Currently     Comment: Quit drinking in ; previously drank from age of 12 until children were born, then from  to .  Heavy drinking including 1/5 tequila and 5-15 beers daily.    • Drug use: Not Currently     Types: Methamphetamines     Comment: Used \"crank\" in the past, sober since .    • Sexual activity: Not Currently     Partners: Female   Lifestyle   • Physical activity     Days per week: Not on file     Minutes per session: Not on file   • Stress: Not on file   Relationships   • Social connections     Talks on phone: Not on file     Gets together: Not on file     Attends Baptism service: Not on file     Active member of club or organization: Not on file     Attends meetings of clubs or organizations: Not on file     Relationship status: Not on file   • Intimate partner violence     Fear of current or ex partner: Not on file     Emotionally abused: Not on file     Physically abused: Not on file     Forced sexual activity: Not on file   Other Topics Concern   • Not on file " "  Social History Narrative   • Not on file     Current Outpatient Medications   Medication Sig Dispense Refill   • cloNIDine (CATAPRES) 0.1 MG Tab Take 1 Tab by mouth 2 Times a Day for 30 days. 180 Tab 1   • NIFEdipine (ADALAT CC) 60 MG CR tablet Take 1 Tab by mouth every day. 90 Tab 3   • metoprolol SR (TOPROL XL) 25 MG TABLET SR 24 HR Take 1 Tab by mouth every day. 90 Tab 1   • lisinopril (PRINIVIL) 40 MG tablet Take 1 Tab by mouth every day for 270 days. 90 Tab 2   • aspirin (ASA) 81 MG Chew Tab chewable tablet Take 81 mg by mouth every day.     • Evolocumab, REPATHA, 140 MG/ML Solution Auto-injector Inject 1 Each as instructed every 14 days. (Patient not taking: Reported on 9/14/2020) 6 Each 3     No current facility-administered medications for this visit.      Family History   Problem Relation Age of Onset   • Stroke Mother          Review of Systems   Neurological: Positive for speech change.   All other systems reviewed and are negative.         Objective:     /80 (BP Location: Left arm, Patient Position: Sitting, BP Cuff Size: Adult)   Pulse 77   Temp 37.3 °C (99.1 °F) (Temporal)   Ht 1.753 m (5' 9\")   Wt 106.1 kg (234 lb)   SpO2 97%   BMI 34.56 kg/m²      Physical Exam  Vitals signs and nursing note reviewed.   Constitutional:       General: He is not in acute distress.     Appearance: He is well-developed. He is not diaphoretic.   HENT:      Head: Normocephalic and atraumatic.      Right Ear: External ear normal.      Left Ear: External ear normal.      Nose: Nose normal.   Eyes:      General:         Right eye: No discharge.         Left eye: No discharge.      Conjunctiva/sclera: Conjunctivae normal.   Neck:      Musculoskeletal: Normal range of motion and neck supple.      Thyroid: No thyromegaly.      Trachea: No tracheal deviation.   Cardiovascular:      Rate and Rhythm: Normal rate and regular rhythm.      Heart sounds: Normal heart sounds. No murmur.   Pulmonary:      Effort: Pulmonary " effort is normal. No respiratory distress.      Breath sounds: Normal breath sounds. No wheezing or rales.   Lymphadenopathy:      Cervical: No cervical adenopathy.   Skin:     General: Skin is warm and dry.      Findings: No erythema or rash.   Neurological:      Mental Status: He is alert and oriented to person, place, and time.      Coordination: Coordination normal.      Comments: Normal strength bilaterally to upper and lower extremities.  Speech slowed for patient but he appears alert and able to answer questions   Psychiatric:         Behavior: Behavior normal.         Thought Content: Thought content normal.         Judgment: Judgment normal.                 Assessment/Plan:        1. Acute lacunar stroke (HCC)  I advised patient that it is important we keep his blood pressure, blood sugar and cholesterol under control.  He will monitor his blood pressure outside the office and if it continues above 130/80, I recommended temporarily increasing his clonidine to 3 pills a day.  He might be a candidate for other medications in the near future.  He is awaiting his Medicare which he hopes to have in the next 4 weeks.  He will continue on his aspirin and since he cannot take a statin, hopefully he can get on Repatha through cardiology.  - REFERRAL TO CARDIOLOGY General Cardiology HECTOR  - REFERRAL TO NEUROLOGY    2. Impaired fasting glucose  Patient advised to watch his carbohydrates to prevent becoming a type II diabetic.  - REFERRAL TO CARDIOLOGY General Cardiology HECTOR    3. Essential hypertension  Patient will continue with same medicines for now and may be a candidate for a diuretic if needed in the near future.  He was advised to follow a low-sodium diet and lose weight.  - REFERRAL TO CARDIOLOGY General Cardiology HECTOR  - cloNIDine (CATAPRES) 0.1 MG Tab; Take 1 Tab by mouth 2 Times a Day for 30 days.  Dispense: 180 Tab; Refill: 1  - NIFEdipine (ADALAT CC) 60 MG CR tablet; Take 1 Tab by mouth every day.   Dispense: 90 Tab; Refill: 3  - metoprolol SR (TOPROL XL) 25 MG TABLET SR 24 HR; Take 1 Tab by mouth every day.  Dispense: 90 Tab; Refill: 1  - Comp Metabolic Panel; Future  - CBC WITHOUT DIFFERENTIAL; Future    4. Dyslipidemia  Patient states he cannot take any statins and cannot currently afford Repatha.  - REFERRAL TO CARDIOLOGY General Cardiology HECTOR    5. Coronary artery disease involving native coronary artery of native heart without angina pectoris  Patient has not seen a cardiologist in over a decade and is currently on aspirin after finishing his Plavix.  He is not on a statin now because he cannot tolerate statins.    6. Need for hepatitis C screening test    - HEP C VIRUS ANTIBODY; Future    7. Screening for prostate cancer    - PROSTATE SPECIFIC AG SCREENING; Future

## 2020-09-18 DIAGNOSIS — I10 ESSENTIAL HYPERTENSION: ICD-10-CM

## 2020-11-09 ENCOUNTER — TELEPHONE (OUTPATIENT)
Dept: CARDIOLOGY | Facility: MEDICAL CENTER | Age: 65
End: 2020-11-09

## 2020-11-09 NOTE — TELEPHONE ENCOUNTER
LVM for pt to confirm if this will be first time seeing a cardiologist.     Pt has NP appt with [facility] on 11-12-20.    Pending call back.

## 2020-11-11 NOTE — TELEPHONE ENCOUNTER
[facility]    Klarissa calling in regards to pt for New appt. Pt saw a cardiologist 20-25 years ago. They cant remember Dr name but had to put in 2 stents during that visit. Since then Dr has moved to a different practice out of state and they dont know where the practice went or where the records have gone as well. Please call back with any further questions at [phone]    Thank you

## 2020-11-12 ENCOUNTER — OFFICE VISIT (OUTPATIENT)
Dept: CARDIOLOGY | Facility: MEDICAL CENTER | Age: 65
End: 2020-11-12
Payer: MEDICARE

## 2020-11-12 VITALS
RESPIRATION RATE: 18 BRPM | BODY MASS INDEX: 34.96 KG/M2 | HEIGHT: 69 IN | HEART RATE: 78 BPM | SYSTOLIC BLOOD PRESSURE: 112 MMHG | DIASTOLIC BLOOD PRESSURE: 84 MMHG | OXYGEN SATURATION: 93 % | WEIGHT: 236 LBS

## 2020-11-12 DIAGNOSIS — E78.5 DYSLIPIDEMIA: ICD-10-CM

## 2020-11-12 DIAGNOSIS — I10 ESSENTIAL HYPERTENSION: ICD-10-CM

## 2020-11-12 DIAGNOSIS — I25.10 CORONARY ARTERY DISEASE INVOLVING NATIVE CORONARY ARTERY OF NATIVE HEART WITHOUT ANGINA PECTORIS: ICD-10-CM

## 2020-11-12 DIAGNOSIS — I63.89 CEREBROVASCULAR ACCIDENT (CVA) DUE TO OTHER MECHANISM (HCC): ICD-10-CM

## 2020-11-12 PROCEDURE — 99204 OFFICE O/P NEW MOD 45 MIN: CPT | Performed by: INTERNAL MEDICINE

## 2020-11-12 RX ORDER — ROSUVASTATIN CALCIUM 5 MG/1
5 TABLET, COATED ORAL EVERY EVENING
Qty: 90 TAB | Refills: 3 | Status: SHIPPED | OUTPATIENT
Start: 2020-11-12 | End: 2021-01-06 | Stop reason: SDUPTHER

## 2020-11-12 ASSESSMENT — FIBROSIS 4 INDEX: FIB4 SCORE: 1.07

## 2020-11-12 NOTE — PROGRESS NOTES
"CARDIOLOGY NEW PATIENT CONSULTATION    PCP: WILFRED Morrison    1. Cerebrovascular accident (CVA) due to other mechanism (HCC)  Lacunar stroke in August.  Carotid duplex negative.  Very hypertensive at the event but well controlled since.  Left atrial size normal.  -30-day event monitor  -Continue aspirin 81 mg daily  -Crestor 5 mg daily prescribed; history of atorvastatin intolerance due to myalgias    2. Coronary artery disease involving native coronary artery of native heart without angina pectoris  PCI circa 2003 after presenting with heartburn and high risk stress test.  No angina.  LVEF 50%  -Agree with aspirin  -Statin initiated    3. Essential hypertension  BP well controlled today and at home.  Continue current medications    4. Dyslipidemia  Statin started.  Goal titrate to achieve a 50% lowering of LDL cholesterol.      Follow up with Leon Menendez M.D. in 3 months    Chief Complaint   Patient presents with   • Hypertension       History: aFwad Florentino is a 65 y.o. male with a past medical history of Coronary artery disease presenting for consultation regarding stroke.  At the end of August he experienced a lacunar stroke manifested as speech difficulties.  At the time the blood pressure was considerably elevated.  He presented late to the hospital and has been managed with usual secondary stroke prevention measures aside from statin therapy which she had previously been intolerant of.  He is slowly making gains and improvement of his speech and is to see the neurology clinic on Monday.  An echocardiogram on the hospital showed normal left atrial size, no shunt and an ejection fraction of 50%.  There is no source of cardioembolism identified.  A carotid duplex showed no significant stenosis.  New    Regarding the coronary disease he reports experiencing \"heartburn\" which prompted him to seek care from his doctor.  He was referred to a cardiologist and a stress test was abruptly terminated and he was " "transferred promptly for coronary angiogram receiving a stent to 1 vessel.  He tried Lipitor for many years after but developed disabling muscle aches and has not been taking it for some time.    ROS:  All other systems reviewed and negative except as per the HPI    PE:  /84 (BP Location: Left arm, Patient Position: Sitting, BP Cuff Size: Large adult)   Pulse 78   Resp 18   Ht 1.753 m (5' 9\")   Wt 107 kg (236 lb)   SpO2 93%   BMI 34.85 kg/m²   Gen: Well appearing  HEENT: Symmetric face. Anicteric sclerae. Moist mucus membranes  NECK: No JVD. No lymphadenopathy  CARDIAC: Normal PMI, regular, normal S1, S2. without murmur  VASCULATURE: Normal carotid amplitude without bruit.   RESP: Clear to auscultation bilaterally  ABD: Soft, non-tender, non-distended  EXT: No edema, no clubbing or cyanosis  SKIN: Warm and dry  NEURO: No gross deficits.  Slurred speech  PSYCH: Appropriate affect, participates in conversation    Past Medical History:   Diagnosis Date   • CAD (coronary artery disease)    • Hypertension      Past Surgical History:   Procedure Laterality Date   • OTHER      tonsilectomy   • OTHER CARDIAC SURGERY      2 coronary stents 2002   • STENT PLACEMENT      cardiac stent 2002     Allergies   Allergen Reactions   • Statins [Hmg-Coa-R Inhibitors] Myalgia     Outpatient Encounter Medications as of 11/12/2020   Medication Sig Dispense Refill   • rosuvastatin (CRESTOR) 5 MG Tab Take 1 Tab by mouth every evening. 90 Tab 3   • NIFEdipine (ADALAT CC) 60 MG CR tablet Take 1 Tab by mouth every day. 90 Tab 3   • metoprolol SR (TOPROL XL) 25 MG TABLET SR 24 HR Take 1 Tab by mouth every day. 90 Tab 3   • lisinopril (PRINIVIL) 40 MG tablet Take 1 Tab by mouth every day for 270 days. 90 Tab 2   • aspirin (ASA) 81 MG Chew Tab chewable tablet Take 81 mg by mouth every day.     • [DISCONTINUED] Evolocumab, REPATHA, 140 MG/ML Solution Auto-injector Inject 1 Each as instructed every 14 days. (Patient not taking: Reported " "on 2020) 6 Each 3     No facility-administered encounter medications on file as of 2020.      Social History     Socioeconomic History   • Marital status:      Spouse name: Not on file   • Number of children: Not on file   • Years of education: Not on file   • Highest education level: Not on file   Occupational History   • Not on file   Social Needs   • Financial resource strain: Not on file   • Food insecurity     Worry: Not on file     Inability: Not on file   • Transportation needs     Medical: Not on file     Non-medical: Not on file   Tobacco Use   • Smoking status: Former Smoker     Packs/day: 1.50     Years: 15.00     Pack years: 22.50     Types: Cigarettes     Start date:      Quit date:      Years since quittin.8   • Smokeless tobacco: Never Used   Substance and Sexual Activity   • Alcohol use: Not Currently     Comment: Quit drinking in ; previously drank from age of 12 until children were born, then from  to .  Heavy drinking including 1/5 tequila and 5-15 beers daily.    • Drug use: Not Currently     Types: Methamphetamines     Comment: Used \"crank\" in the past, sober since .    • Sexual activity: Not Currently     Partners: Female   Lifestyle   • Physical activity     Days per week: Not on file     Minutes per session: Not on file   • Stress: Not on file   Relationships   • Social connections     Talks on phone: Not on file     Gets together: Not on file     Attends Restorationism service: Not on file     Active member of club or organization: Not on file     Attends meetings of clubs or organizations: Not on file     Relationship status: Not on file   • Intimate partner violence     Fear of current or ex partner: Not on file     Emotionally abused: Not on file     Physically abused: Not on file     Forced sexual activity: Not on file   Other Topics Concern   • Not on file   Social History Narrative   • Not on file         Family History   Problem Relation Age " of Onset   • Stroke Mother          Studies  Lab Results   Component Value Date/Time    CHOLSTRLTOT 180 08/29/2020 03:52 AM     (H) 08/29/2020 03:52 AM    HDL 37 (A) 08/29/2020 03:52 AM    TRIGLYCERIDE 109 08/29/2020 03:52 AM       Lab Results   Component Value Date/Time    SODIUM 139 08/29/2020 03:52 AM    POTASSIUM 3.3 (L) 08/29/2020 03:52 AM    CHLORIDE 107 08/29/2020 03:52 AM    CO2 21 08/29/2020 03:52 AM    GLUCOSE 105 (H) 08/29/2020 03:52 AM    BUN 12 08/29/2020 03:52 AM    CREATININE 0.70 08/29/2020 03:52 AM     Lab Results   Component Value Date/Time    ALKPHOSPHAT 60 08/29/2020 03:52 AM    ASTSGOT 13 08/29/2020 03:52 AM    ALTSGPT 18 08/29/2020 03:52 AM    TBILIRUBIN 0.6 08/29/2020 03:52 AM        For this encounter I reviewed medical records.     I directly reviewed ECG tracings and I agree with the interpretations in the electronic health record

## 2020-11-16 ENCOUNTER — OFFICE VISIT (OUTPATIENT)
Dept: NEUROLOGY | Facility: MEDICAL CENTER | Age: 65
End: 2020-11-16
Payer: MEDICARE

## 2020-11-16 VITALS
HEART RATE: 78 BPM | WEIGHT: 238.1 LBS | SYSTOLIC BLOOD PRESSURE: 138 MMHG | HEIGHT: 69 IN | BODY MASS INDEX: 35.27 KG/M2 | OXYGEN SATURATION: 94 % | TEMPERATURE: 97.8 F | RESPIRATION RATE: 16 BRPM | DIASTOLIC BLOOD PRESSURE: 88 MMHG

## 2020-11-16 DIAGNOSIS — I69.30 LATE EFFECT OF STROKE: ICD-10-CM

## 2020-11-16 DIAGNOSIS — Z91.89 AT RISK FOR OBSTRUCTIVE SLEEP APNEA: ICD-10-CM

## 2020-11-16 DIAGNOSIS — E78.2 MIXED HYPERLIPIDEMIA: ICD-10-CM

## 2020-11-16 DIAGNOSIS — I10 ESSENTIAL HYPERTENSION: ICD-10-CM

## 2020-11-16 PROCEDURE — 99215 OFFICE O/P EST HI 40 MIN: CPT | Performed by: NURSE PRACTITIONER

## 2020-11-16 ASSESSMENT — ENCOUNTER SYMPTOMS
SPEECH CHANGE: 1
WEAKNESS: 0
HEARTBURN: 0
COUGH: 0
VOMITING: 0
HEADACHES: 0
SENSORY CHANGE: 0
DIZZINESS: 0
NERVOUS/ANXIOUS: 0
FEVER: 0
SHORTNESS OF BREATH: 0
NAUSEA: 0
BRUISES/BLEEDS EASILY: 0
DOUBLE VISION: 0
MYALGIAS: 0
DEPRESSION: 0
PALPITATIONS: 0
BLURRED VISION: 0
CHILLS: 0

## 2020-11-16 ASSESSMENT — FIBROSIS 4 INDEX: FIB4 SCORE: 1.07

## 2020-11-16 NOTE — PATIENT INSTRUCTIONS
Stroke Prevention for Fawad Florentino    Blood pressure goal less than 130/80, above goal today, follow up with primary care to get to goal.     LDL (bad cholesterol) goal less than 70, current 121.  Start Rosuvastatin (Crestor 5mg), if tolerating, would eventually like to go up to 20mg (slowly) this is the minimum dose that helps to prevent stroke because it not only helps to lower cholesterol, it helps to prevent plaque build-up.    Exercise at least 30 minutes daily, avoid red meat, fried foods, butter, cheese.   Eat 5-6 servings of vegetables and fruits daily, choose lean white meat without skin (chicken, turkey, white fish)--baked, broiled or grilled.    Sleep apnea can lead to stroke by raising blood pressure at night and possibly causing atrial fibrillation, both of these can cause stroke, please follow up with sleep medicine for evaluation of sleep apnea, I put in a referral.     A1C (90 day blood sugar average index) is 5.7, this is consistent with prediabets, in order to avoid progressing to diabetes, avoid fruit juices, sweets, watch serving sizes of breads, pastas, rices, potatoes.             Cholesterol Content in Foods  Cholesterol is a waxy, fat-like substance that helps to carry fat in the blood. The body needs cholesterol in small amounts, but too much cholesterol can cause damage to the arteries and heart.  Most people should eat less than 200 milligrams (mg) of cholesterol a day.  Foods with cholesterol    Cholesterol is found in animal-based foods, such as meat, seafood, and dairy. Generally, low-fat dairy and lean meats have less cholesterol than full-fat dairy and fatty meats. The milligrams of cholesterol per serving (mg per serving) of common cholesterol-containing foods are listed below.  Meat and other proteins  · Egg -- one large whole egg has 186 mg.  · Veal shank -- 4 oz has 141 mg.  · Lean ground turkey (93% lean) -- 4 oz has 118 mg.  · Fat-trimmed lamb loin -- 4 oz has 106 mg.  · Lean  ground beef (90% lean) -- 4 oz has 100 mg.  · Lobster -- 3.5 oz has 90 mg.  · Pork loin chops -- 4 oz has 86 mg.  · Canned salmon -- 3.5 oz has 83 mg.  · Fat-trimmed beef top loin -- 4 oz has 78 mg.  · Frankfurter -- 1 aj (3.5 oz) has 77 mg.  · Crab -- 3.5 oz has 71 mg.  · Roasted chicken without skin, white meat -- 4 oz has 66 mg.  · Light bologna -- 2 oz has 45 mg.  · Deli-cut turkey -- 2 oz has 31 mg.  · Canned tuna -- 3.5 oz has 31 mg.  · Gonzalez -- 1 oz has 29 mg.  · Oysters and mussels (raw) -- 3.5 oz has 25 mg.  · Mackerel -- 1 oz has 22 mg.  · Trout -- 1 oz has 20 mg.  · Pork sausage -- 1 link (1 oz) has 17 mg.  · Ceres -- 1 oz has 16 mg.  · Tilapia -- 1 oz has 14 mg.  Dairy  · Soft-serve ice cream -- ½ cup (4 oz) has 103 mg.  · Whole-milk yogurt -- 1 cup (8 oz) has 29 mg.  · Cheddar cheese -- 1 oz has 28 mg.  · American cheese -- 1 oz has 28 mg.  · Whole milk -- 1 cup (8 oz) has 23 mg.  · 2% milk -- 1 cup (8 oz) has 18 mg.  · Cream cheese -- 1 tablespoon (Tbsp) has 15 mg.  · Cottage cheese -- ½ cup (4 oz) has 14 mg.  · Low-fat (1%) milk -- 1 cup (8 oz) has 10 mg.  · Sour cream -- 1 Tbsp has 8.5 mg.  · Low-fat yogurt -- 1 cup (8 oz) has 8 mg.  · Nonfat Greek yogurt -- 1 cup (8 oz) has 7 mg.  · Half-and-half cream -- 1 Tbsp has 5 mg.  Fats and oils  · Cod liver oil -- 1 tablespoon (Tbsp) has 82 mg.  · Butter -- 1 Tbsp has 15 mg.  · Lard -- 1 Tbsp has 14 mg.  · Gonzalez grease -- 1 Tbsp has 14 mg.  · Mayonnaise -- 1 Tbsp has 5-10 mg.  · Margarine -- 1 Tbsp has 3-10 mg.  Exact amounts of cholesterol in these foods may vary depending on specific ingredients and brands.  Foods without cholesterol  Most plant-based foods do not have cholesterol unless you combine them with a food that has cholesterol. Foods without cholesterol include:  · Grains and cereals.  · Vegetables.  · Fruits.  · Vegetable oils, such as olive, canola, and sunflower oil.  · Legumes, such as peas, beans, and lentils.  · Nuts and seeds.  · Egg  whites.  Summary  · The body needs cholesterol in small amounts, but too much cholesterol can cause damage to the arteries and heart.  · Most people should eat less than 200 milligrams (mg) of cholesterol a day.  This information is not intended to replace advice given to you by your health care provider. Make sure you discuss any questions you have with your health care provider.  Document Released: 08/14/2018 Document Revised: 11/30/2018 Document Reviewed: 08/14/2018  [employer] Patient Education © 2020 [employer]        Stroke Prevention  Some medical conditions and lifestyle choices can lead to a higher risk for a stroke. You can help to prevent a stroke by making nutrition, lifestyle, and other changes.  What nutrition changes can be made?    · Eat healthy foods.  ? Choose foods that are high in fiber. These include:  § Fresh fruits.  § Fresh vegetables.  § Whole grains.  ? Eat at least 5 or more servings of fruits and vegetables each day. Try to fill half of your plate at each meal with fruits and vegetables.  ? Choose lean protein foods. These include:  § Lowfat (lean) cuts of meat.  § Chicken without skin.  § Fish.  § Tofu.  § Beans.  § Nuts.  ? Eat low-fat dairy products.  ? Avoid foods that:  § Are high in salt (sodium).  § Have saturated fat.  § Have trans fat.  § Have cholesterol.  § Are processed.  § Are premade.  · Follow eating guidelines as told by your doctor. These may include:  ? Reducing how many calories you eat and drink each day.  ? Limiting how much salt you eat or drink each day to 1,500 milligrams (mg).  ? Using only healthy fats for cooking. These include:  § Olive oil.  § Canola oil.  § Sunflower oil.  ? Counting how many carbohydrates you eat and drink each day.  What lifestyle changes can be made?  · Try to stay at a healthy weight. Talk to your doctor about what a good weight is for you.  · Get at least 30 minutes of moderate physical activity at least 5 days a week. This can  include:  ? Fast walking.  ? Biking.  ? Swimming.  · Do not use any products that have nicotine or tobacco. This includes cigarettes and e-cigarettes. If you need help quitting, ask your doctor. Avoid being around tobacco smoke in general.  · Limit how much alcohol you drink to no more than 1 drink a day for nonpregnant women and 2 drinks a day for men. One drink equals 12 oz of beer, 5 oz of wine, or 1½ oz of hard liquor.  · Do not use drugs.  · Avoid taking birth control pills. Talk to your doctor about the risks of taking birth control pills if:  ? You are over 35 years old.  ? You smoke.  ? You get migraines.  ? You have had a blood clot.  What other changes can be made?  · Manage your cholesterol.  ? It is important to eat a healthy diet.  ? If your cholesterol cannot be managed through your diet, you may also need to take medicines. Take medicines as told by your doctor.  · Manage your diabetes.  ? It is important to eat a healthy diet and to exercise regularly.  ? If your blood sugar cannot be managed through diet and exercise, you may need to take medicines. Take medicines as told by your doctor.  · Control your high blood pressure (hypertension).  ? Try to keep your blood pressure below 130/80. This can help lower your risk of stroke.  ? It is important to eat a healthy diet and to exercise regularly.  ? If your blood pressure cannot be managed through diet and exercise, you may need to take medicines. Take medicines as told by your doctor.  ? Ask your doctor if you should check your blood pressure at home.  ? Have your blood pressure checked every year. Do this even if your blood pressure is normal.  · Talk to your doctor about getting checked for a sleep disorder. Signs of this can include:  ? Snoring a lot.  ? Feeling very tired.  · Take over-the-counter and prescription medicines only as told by your doctor. These may include aspirin or blood thinners (antiplatelets or anticoagulants).  · Make sure that  "any other medical conditions you have are managed.  Where to find more information  · American Stroke Association: www.strokeassociation.org  · National Stroke Association: www.stroke.org  Get help right away if:  · You have any symptoms of stroke. \"BE FAST\" is an easy way to remember the main warning signs:  ? B - Balance. Signs are dizziness, sudden trouble walking, or loss of balance.  ? E - Eyes. Signs are trouble seeing or a sudden change in how you see.  ? F - Face. Signs are sudden weakness or loss of feeling of the face, or the face or eyelid drooping on one side.  ? A - Arms. Signs are weakness or loss of feeling in an arm. This happens suddenly and usually on one side of the body.  ? S - Speech. Signs are sudden trouble speaking, slurred speech, or trouble understanding what people say.  ? T - Time. Time to call emergency services. Write down what time symptoms started.  · You have other signs of stroke, such as:  ? A sudden, very bad headache with no known cause.  ? Feeling sick to your stomach (nausea).  ? Throwing up (vomiting).  ? Jerky movements you cannot control (seizure).  These symptoms may represent a serious problem that is an emergency. Do not wait to see if the symptoms will go away. Get medical help right away. Call your local emergency services (911 in the [place] Do not drive yourself to the hospital.  Summary  · You can prevent a stroke by eating healthy, exercising, not smoking, drinking less alcohol, and treating other health problems, such as diabetes, high blood pressure, or high cholesterol.  · Do not use any products that contain nicotine or tobacco, such as cigarettes and e-cigarettes.  · Get help right away if you have any signs or symptoms of a stroke.  This information is not intended to replace advice given to you by your health care provider. Make sure you discuss any questions you have with your health care provider.  Document Released: 06/18/2013 Document Revised: 02/13/2020 " Document Reviewed: 03/21/2018  [employer] Patient Education © 2020 [employer]

## 2020-11-16 NOTE — PROGRESS NOTES
Subjective:    MARQUITA Florentino is a 65  y.o. right handed male who presents to [facility] for evaluation of multiple chronic lacunar infarcts as well as acute lacune of posterior limb of left internal capsule in 8/2020.     He presented to [facility] on 8/28/2020 with  4 days of slurred speech.  He had not been on any medications due to insurance lapse.  Blood pressure 230/140 on admission.       PMH includes HTN, HLD (with intolerance to statins), CAD  Social History: Former smoker quit around 2000,  former methamphetamine use. Denies alcohol use      He was discharged on 21 days of DAPT with ASA and Plavix, then ASA 81mg daily.       He is here today with his ex-wife.  He still has slurred speech, he denies any weakness or numbness.     He recently saw primary care who prescribed rosuvastatin 5mg, he has not picked it up yet.      His ex-wife reports that he stops breathing when he sleeps.  He falls asleep sitting up during the daytime.      Review of Systems   Constitutional: Negative for chills and fever.   HENT: Negative for hearing loss and tinnitus.    Eyes: Negative for blurred vision and double vision.   Respiratory: Negative for cough and shortness of breath.    Cardiovascular: Negative for chest pain and palpitations.   Gastrointestinal: Negative for heartburn, nausea and vomiting.   Genitourinary: Negative for dysuria.   Musculoskeletal: Negative for myalgias.   Skin: Negative for rash.   Neurological: Positive for speech change. Negative for dizziness, sensory change, weakness and headaches.   Endo/Heme/Allergies: Does not bruise/bleed easily.   Psychiatric/Behavioral: Negative for depression. The patient is not nervous/anxious.          Past Medical History:   Diagnosis Date   • CAD (coronary artery disease)    • Hypertension      Current Outpatient Medications on File Prior to Visit   Medication Sig Dispense Refill   • NIFEdipine (ADALAT CC) 60 MG CR tablet Take 1 Tab  by mouth every day. 90 Tab 3   • metoprolol SR (TOPROL XL) 25 MG TABLET SR 24 HR Take 1 Tab by mouth every day. 90 Tab 3   • lisinopril (PRINIVIL) 40 MG tablet Take 1 Tab by mouth every day for 270 days. 90 Tab 2   • aspirin (ASA) 81 MG Chew Tab chewable tablet Take 81 mg by mouth every day.     • rosuvastatin (CRESTOR) 5 MG Tab Take 1 Tab by mouth every evening. (Patient not taking: Reported on 11/16/2020) 90 Tab 3     No current facility-administered medications on file prior to visit.         Objective:   I personally reviewed imaging below and agree with the findings  MRI brain 8/29/2020  1.  Mild cerebral atrophy.  2.  Multiple old lacunar infarcts bilateral frontal deep white matter, bilateral basal ganglia.  3.  Acute lacunar size infarct in the left posterior limb internal capsule.  4.  Small oblong focus of old hemorrhagic infarction in the left posterior frontal periventricular white matter along the corona radiata just above the left thalamus.  5.  Multiple punctate foci of old microhemorrhage most consistent with old hypertensive microhemorrhage.  6.  Mild pontine ischemic gliosis. Tiny punctate focus of old lacunar size infarction in the left paramedian leila.  7.  Crescentic old lacunar size infarct in the left cerebellar hemisphere and tiny old lacunar infarct in the right cerebellar hemisphere.  Carotid US 8/29/2020  Right carotid.    Very mild soft and calcified plaque of the carotid bifurcation. Doppler  velocities are normal.    Antegrade right vertebral flow.    Triphasic waveforms seen in the subclavian artery.      Left carotid.    Smooth and calcified plaque of the bifurcation extending into the internal carotid. Velocities are consistent with < 50% stenosis of the internal  carotid artery.    Antegrade left vertebral flow.    Triphasic waveforms seen in the subclavian artery.    TTE:  LVEF 50%, bubble negative, LA size WNL, EARLE 19    Stroke Labs:  Cr 0.70, A1C 5.7 ,       Encounter  "Vitals  Standard Vitals  Vitals  Blood Pressure : 138/88  Temperature: 36.6 °C (97.8 °F)  Temp src: Temporal  Pulse: 78  Respiration: 16  Pulse Oximetry: 94 %  Height: 175.3 cm (5' 9\")  Weight: 108 kg (238 lb 1.6 oz)  Encounter Vitals  Temperature: 36.6 °C (97.8 °F)  Temp src: Temporal  Blood Pressure : 138/88  Pulse: 78  Respiration: 16  Pulse Oximetry: 94 %  Weight: 108 kg (238 lb 1.6 oz)  Height: 175.3 cm (5' 9\")  BMI (Calculated): 35.1      Physical Exam      Constitutional:  Alert, no apparent distress,   Psych:   mood and affect WNL  Neuro:  Oriented X 4, speech fluent slightly slurred at times,  naming and memory intact  Muskuloskeletal:  Moves all extremities equally, strength 5/5 bilaterally  CN II: Visual fields are full to confrontation. Fundoscopic exam is normal with sharp discs and no vascular changes. Pupils are 4 mm and briskly reactive to light.   CN III, IV, VI  EOMs intact, no ptosis  CN V: Facial sensation is intact to pinprick in all 3 divisions bilaterally. Corneal responses are intact.  CN VII: Face is symmetric with normal eye closure and smile.  CN VII: Hearing is normal to rubbing fingers  CN IX, X: Palate elevates symmetrically. Phonation is normal.  CN XI: Head turning and shoulder shrug are intact  CN XII: Tongue is midline with normal movements and no atrophy.              Strength 5/5 BUE/BLE, no drift                 Sensation to PP equal bilaterally                 No limb ataxia with finger to nose and heel to shin                 Ambulates with steady gait.                 Rhomberg negative                Biceps,brachioradialis, tricep, patellar and ankle reflex all 2+     Cardiovascular:    S1S2, no abnormal rhythm auscultated, no peripheral edema  Neck:                     No carotid bruits noted   Pulmonary:            Respirations easy, lungs clear to auscultation all fields.     Skin:                     No obvious rashes.    HENT:    mallampati 4.        Iniital NIHSS:  " Unknown    Current NIHSS    1a. LOC: 0  1b. LOC Questions: 0  1c. LOC Commands: 0  2. Best Gaze:0  3. Visual Fields: 0  4. Facial Paresis:0   5a. Motor arm left: 0  5b. Motor arm right: 0  6a. Motor leg left: 0  6b. Motor leg right: 0  7. Sensory:1  8. Best Language: 0  9. Limb Ataxia: 0  10. Dysarthria: 0  11. Extinction/Inattention: 0    Total Score Current 1        Current mRS  1         Assessment/Plan:   1. Late Effect of Stroke.  Acute lacunar infarct of left internal capsule as well as multiple remote lacunes on FLAIR in the setting of uncontrolled HTN, HLD,with poor medication compliance.  Target aggressive risk factor control.     Plan:  Presumed Mechanism by Toast:  __  Large Artery Atherosclerosis  __xx  Small Vessel (lacunar)  __   Cardioembolic  __   Other (Sickle cell, vasculitis, hypercoagulable)  __   Unknown  __   ESUS    Stroke risk factors include HTN, HLD, pre-diabetes and probable sleep apnea.       Continue Aspirin 81mg PO daily,  discussed side effects, signs of bleeding      A1C 5.7, consistent with pre-diabetes, discussed dietary modifications.       2.  Essential Hypertension  Blood pressure goal less than 130/80, currently above goal, follow up with primary care to get to goal.     3. Mixed hyperlipidemia.  LDL goal < 70, current 122, continue Rosuvastatin 5mg,increase slowly to a goal of 20-40mg if tolerated, high-dose statins are reccommended for stroke prevention per SPARCL trial,  he has a history of statin intolerance.  discussed medication side effects, will need follow up with primary care evaluate liver function and intervals and refill    Exercise at least 30 minutes daily, avoid red meat, fried foods, butter, cheese.   Eat 5-6 servings of vegetables and fruits daily, choose lean white meat without skin (chicken, turkey, white fish)--baked, broiled or grilled.        4. At risk for obstructive sleep apnea  Referral to pulmonology for sleep study    Mallampati 4  Daytime  sleepiness  Witnessed apneas.     I have counseled patient on stroke prevention strategies, stroke symptoms and mimics.  Diet and exercise modifications.  We discussed medication side effects and instructions.

## 2020-12-16 ENCOUNTER — SLEEP CENTER VISIT (OUTPATIENT)
Dept: SLEEP MEDICINE | Facility: MEDICAL CENTER | Age: 65
End: 2020-12-16
Payer: MEDICARE

## 2020-12-16 VITALS
HEIGHT: 69 IN | SYSTOLIC BLOOD PRESSURE: 124 MMHG | DIASTOLIC BLOOD PRESSURE: 84 MMHG | WEIGHT: 234 LBS | RESPIRATION RATE: 16 BRPM | OXYGEN SATURATION: 96 % | BODY MASS INDEX: 34.66 KG/M2 | HEART RATE: 73 BPM

## 2020-12-16 DIAGNOSIS — I63.81 ACUTE LACUNAR STROKE (HCC): ICD-10-CM

## 2020-12-16 DIAGNOSIS — G47.30 SLEEP DISORDER BREATHING: ICD-10-CM

## 2020-12-16 DIAGNOSIS — I10 ESSENTIAL HYPERTENSION: ICD-10-CM

## 2020-12-16 PROCEDURE — 99204 OFFICE O/P NEW MOD 45 MIN: CPT | Performed by: FAMILY MEDICINE

## 2020-12-16 RX ORDER — ZOLPIDEM TARTRATE 5 MG/1
5 TABLET ORAL NIGHTLY PRN
Qty: 2 TAB | Refills: 0 | Status: SHIPPED | OUTPATIENT
Start: 2020-12-16 | End: 2020-12-17

## 2020-12-16 ASSESSMENT — FIBROSIS 4 INDEX: FIB4 SCORE: 1.07

## 2020-12-16 NOTE — PROGRESS NOTES
"     [facility]  Consult Note     Date: 12/16/2020 / Time: 10:55 AM    Patient ID:   Name:             Fawad Florentino     YOB: 1955  Age:                 65 y.o.  male   MRN:               [id number]      Thank you for requesting a sleep medicine consultation on Fawad Florentino at the sleep center. He presents today with the chief complaints of snoring, witnessed apneas and non restful. He had a CVA about 6 weeks a go. He is referred by Antionette Swain for evaluation and treatment of sleep disorder breathing.     HISTORY OF PRESENT ILLNESS:       At night,  Fawad Florentino goes to bed around 9 pm on weekdays and on the weekends. He gets out of bed at 4-7 am on weekdays and on the weekends.  He  Averages about 9 hrs of sleep on a good night and 4 hrs on a bad night. Pt has bad nights about 1-2 nights per week. He falls asleep within 10 minutes. He wakes up about 1 times during the night due to bathroom use and on average It takes him few min to fall back asleep.  He is not aware of snoring/breathing pauses/gasping or choking in sleep.  He  denies any symptoms of restless legs syndrome such as an \"urge to move\"  He  legs in the evening or bedtime. He  denies any symptoms of narcolepsy such as sleep paralysis or cataplexy, or any symptoms to suggest parasomnias such as sleep walking or acting out of dreams. He  has not used any medications for the sleep problem.Overall, he doesnot finds his sleep refreshing. In terms of  excessive daytime sleepiness,  He complains of sleepiness while reading or watching TV however denies while driving. He does take regular naps. The naps are usually 10-15 min long.He drinks about 2-3 caffeinated beverages per day.    His other comorbid conditions include CVA, CAD, HTN and DLD. He denies residual weakness from his CVA. His chronic conditions are stable on Crestor, nifedipine, metoprolol SR, lisinopril and ASA.     REVIEW OF SYSTEMS:       Constitutional: Denies fevers, " Denies weight changes  Eyes: Denies changes in vision, no eye pain  Ears/Nose/Throat/Mouth: Denies nasal congestion or sore throat   Cardiovascular: Denies chest pain or palpitations   Respiratory: Denies shortness of breath , Denies cough  Gastrointestinal/Hepatic: Denies abdominal pain, nausea, vomiting,   Genitourinary: Denies bladder dysfunction, dysuria or frequency  Skin/Breast: Denies rash  Neurological: Denies headache,   Psychiatric: denies mood disorder     Comprehensive review of systems form is reviewed with the patient and is attached in the EMR.     PMH:  has a past medical history of CAD (coronary artery disease), Chickenpox, Hebrew measles, and Hypertension.  MEDS:   Current Outpatient Medications:   •  rosuvastatin (CRESTOR) 5 MG Tab, Take 1 Tab by mouth every evening., Disp: 90 Tab, Rfl: 3  •  NIFEdipine (ADALAT CC) 60 MG CR tablet, Take 1 Tab by mouth every day., Disp: 90 Tab, Rfl: 3  •  metoprolol SR (TOPROL XL) 25 MG TABLET SR 24 HR, Take 1 Tab by mouth every day., Disp: 90 Tab, Rfl: 3  •  lisinopril (PRINIVIL) 40 MG tablet, Take 1 Tab by mouth every day for 270 days., Disp: 90 Tab, Rfl: 2  •  aspirin (ASA) 81 MG Chew Tab chewable tablet, Take 81 mg by mouth every day., Disp: , Rfl:   ALLERGIES:   Allergies   Allergen Reactions   • Statins [Hmg-Coa-R Inhibitors] Myalgia     SURGHX:   Past Surgical History:   Procedure Laterality Date   • OTHER      tonsilectomy   • OTHER CARDIAC SURGERY      2 coronary stents 2002   • STENT PLACEMENT      cardiac stent 2002     SOCHX:  reports that he quit smoking about 20 years ago. His smoking use included cigarettes. He started smoking about 25 years ago. He has a 22.50 pack-year smoking history. He has never used smokeless tobacco. He reports previous alcohol use. He reports previous drug use. Drug: Methamphetamines.   FH:   Family History   Problem Relation Age of Onset   • Stroke Mother    • Sleep Apnea Neg Hx        Physical Exam:  Vitals/ General  "Appearance:   Weight/BMI: Body mass index is 34.56 kg/m².  Resp 16   Ht 1.753 m (5' 9\")   Wt 106.1 kg (234 lb)   Vitals:    12/16/20 1051   Resp: 16   Weight: 106.1 kg (234 lb)   Height: 1.753 m (5' 9\")           Constitutional: Alert, no distress, well-groomed.  Skin: No rashes in visible areas.  Eye: Round. Conjunctiva clear, lids normal. No icterus.   ENMT: Lips pink without lesions, good dentition, moist mucous membranes. Phonation normal.  Neck: No masses, no thyromegaly. Moves freely without pain.  CV: Pulse as reported by patient  Respiratory: Unlabored respiratory effort, no cough or audible wheeze  Psych: Alert and oriented x3, normal affect and mood.   INVESTIGATIONS:       ASSESSMENT AND PLAN     1. He  has symptoms of Obstructive Sleep Apnea (ASHLEE). He  has excessive daytime sleepiness that  interferes with activites of daily living. He  risk factors for ASHLEE include obesity, thick neck, and crowded oropharynx.     The pathophysiology of ASHLEE and the increased risk of cardiovascular morbidity from untreated ASHLEE is discussed in detail with the patient. He  also has HTN and CVA which can be worsened by her ASHLEE.     We have discussed diagnostic options including in-laboratory, attended polysomnography and home sleep testing. We have also discussed treatment options including airway pressurization, reconstructive otolaryngologic surgery, dental appliances and weight management.       Subsequently,treatment options will be discussed based on the diagnostic study. Meanwhile, He is urged to avoid supine sleep, weight gain and alcoholic beverages since all of these can worsen ASHLEE. He is cautioned against drowsy driving. If He feels sleepy while driving, He must pull over for a break/nap, rather than persist on the road, in the interest of He own safety and that of others on the road.    Plan  -  He  will be scheduled for an overnight PSG to assess sleep related  breathing disorder.   - Ambien is prescribed only " for the night of the SS. I have obtained and reviewed patient utilization report from [facility] prior to writing prescription for controlled substance II, III or IV. Based on the report and my clinical assessment the prescription is medically necessary. Pt was advised to use Ambien on as needed basis. Do not drive or use fast moving machinery after taking the medication for at least 6-8 hrs. Side affects were discussed in detail.      2. Regarding treatment of other past medical problems and general health maintenance,  He is urged to follow up with PCP.

## 2020-12-16 NOTE — PATIENT INSTRUCTIONS
Sleep Apnea  Sleep apnea is a condition in which breathing pauses or becomes shallow during sleep. Episodes of sleep apnea usually last 10 seconds or longer, and they may occur as many as 20 times an hour. Sleep apnea disrupts your sleep and keeps your body from getting the rest that it needs. This condition can increase your risk of certain health problems, including:  · Heart attack.  · Stroke.  · Obesity.  · Diabetes.  · Heart failure.  · Irregular heartbeat.  What are the causes?  There are three kinds of sleep apnea:  · Obstructive sleep apnea. This kind is caused by a blocked or collapsed airway.  · Central sleep apnea. This kind happens when the part of the brain that controls breathing does not send the correct signals to the muscles that control breathing.  · Mixed sleep apnea. This is a combination of obstructive and central sleep apnea.  The most common cause of this condition is a collapsed or blocked airway. An airway can collapse or become blocked if:  · Your throat muscles are abnormally relaxed.  · Your tongue and tonsils are larger than normal.  · You are overweight.  · Your airway is smaller than normal.  What increases the risk?  You are more likely to develop this condition if you:  · Are overweight.  · Smoke.  · Have a smaller than normal airway.  · Are elderly.  · Are male.  · Drink alcohol.  · Take sedatives or tranquilizers.  · Have a family history of sleep apnea.  What are the signs or symptoms?  Symptoms of this condition include:  · Trouble staying asleep.  · Daytime sleepiness and tiredness.  · Irritability.  · Loud snoring.  · Morning headaches.  · Trouble concentrating.  · Forgetfulness.  · Decreased interest in sex.  · Unexplained sleepiness.  · Mood swings.  · Personality changes.  · Feelings of depression.  · Waking up often during the night to urinate.  · Dry mouth.  · Sore throat.  How is this diagnosed?  This condition may be diagnosed with:  · A medical history.  · A physical  exam.  · A series of tests that are done while you are sleeping (sleep study). These tests are usually done in a sleep lab, but they may also be done at home.  How is this treated?  Treatment for this condition aims to restore normal breathing and to ease symptoms during sleep. It may involve managing health issues that can affect breathing, such as high blood pressure or obesity. Treatment may include:  · Sleeping on your side.  · Using a decongestant if you have nasal congestion.  · Avoiding the use of depressants, including alcohol, sedatives, and narcotics.  · Losing weight if you are overweight.  · Making changes to your diet.  · Quitting smoking.  · Using a device to open your airway while you sleep, such as:  ? An oral appliance. This is a custom-made mouthpiece that shifts your lower jaw forward.  ? A continuous positive airway pressure (CPAP) device. This device blows air through a mask when you breathe out (exhale).  ? A nasal expiratory positive airway pressure (EPAP) device. This device has valves that you put into each nostril.  ? A bi-level positive airway pressure (BPAP) device. This device blows air through a mask when you breathe in (inhale) and breathe out (exhale).  · Having surgery if other treatments do not work. During surgery, excess tissue is removed to create a wider airway.  It is important to get treatment for sleep apnea. Without treatment, this condition can lead to:  · High blood pressure.  · Coronary artery disease.  · In men, an inability to achieve or maintain an erection (impotence).  · Reduced thinking abilities.  Follow these instructions at home:  Lifestyle  · Make any lifestyle changes that your health care provider recommends.  · Eat a healthy, well-balanced diet.  · Take steps to lose weight if you are overweight.  · Avoid using depressants, including alcohol, sedatives, and narcotics.  · Do not use any products that contain nicotine or tobacco, such as cigarettes,  e-cigarettes, and chewing tobacco. If you need help quitting, ask your health care provider.  General instructions  · Take over-the-counter and prescription medicines only as told by your health care provider.  · If you were given a device to open your airway while you sleep, use it only as told by your health care provider.  · If you are having surgery, make sure to tell your health care provider you have sleep apnea. You may need to bring your device with you.  · Keep all follow-up visits as told by your health care provider. This is important.  Contact a health care provider if:  · The device that you received to open your airway during sleep is uncomfortable or does not seem to be working.  · Your symptoms do not improve.  · Your symptoms get worse.  Get help right away if:  · You develop:  ? Chest pain.  ? Shortness of breath.  ? Discomfort in your back, arms, or stomach.  · You have:  ? Trouble speaking.  ? Weakness on one side of your body.  ? Drooping in your face.  These symptoms may represent a serious problem that is an emergency. Do not wait to see if the symptoms will go away. Get medical help right away. Call your local emergency services (911 in the [place] Do not drive yourself to the hospital.  Summary  · Sleep apnea is a condition in which breathing pauses or becomes shallow during sleep.  · The most common cause is a collapsed or blocked airway.  · The goal of treatment is to restore normal breathing and to ease symptoms during sleep.  This information is not intended to replace advice given to you by your health care provider. Make sure you discuss any questions you have with your health care provider.  Document Released: 12/08/2003 Document Revised: 10/04/2019 Document Reviewed: 08/13/2019  Elsevier Patient Education © 2020 [employer]  Zolpidem tablets  What is this medicine?  ZOLPIDEM (zole PI dem) is used to treat insomnia. This medicine helps you to fall asleep and sleep through the  night.  This medicine may be used for other purposes; ask your health care provider or pharmacist if you have questions.  COMMON BRAND NAME(S): Sarahy  What should I tell my health care provider before I take this medicine?  They need to know if you have any of these conditions:  · depression  · history of drug abuse or addiction  · if you often drink alcohol  · liver disease  · lung or breathing disease  · myasthenia gravis  · sleep apnea  · sleep-walking, driving, eating or other activity while not fully awake after taking a sleep medicine  · suicidal thoughts, plans, or attempt; a previous suicide attempt by you or a family member  · an unusual or allergic reaction to zolpidem, other medicines, foods, dyes, or preservatives  · pregnant or trying to get pregnant  · breast-feeding  How should I use this medicine?  Take this medicine by mouth with a glass of water. Follow the directions on the prescription label. It is better to take this medicine on an empty stomach and only when you are ready for bed. Do not take your medicine more often than directed. If you have been taking this medicine for several weeks and suddenly stop taking it, you may get unpleasant withdrawal symptoms. Your doctor or health care professional may want to gradually reduce the dose. Do not stop taking this medicine on your own. Always follow your doctor or health care professional's advice.  A special MedGuide will be given to you by the pharmacist with each prescription and refill. Be sure to read this information carefully each time.  Talk to your pediatrician regarding the use of this medicine in children. Special care may be needed.  Overdosage: If you think you have taken too much of this medicine contact a poison control center or emergency room at once.  NOTE: This medicine is only for you. Do not share this medicine with others.  What if I miss a dose?  This does not apply. This medicine should only be taken immediately before going  "to sleep. Do not take double or extra doses.  What may interact with this medicine?  · alcohol  · antihistamines for allergy, cough and cold  · certain medicines for anxiety or sleep  · certain medicines for depression, like amitriptyline, fluoxetine, sertraline  · certain medicines for fungal infections like ketoconazole and itraconazole  · certain medicines for seizures like phenobarbital, primidone  · ciprofloxacin  · dietary supplements for sleep, like valerian or kava kava  · general anesthetics like halothane, isoflurane, methoxyflurane, propofol  · local anesthetics like lidocaine, pramoxine, tetracaine  · medicines that relax muscles for surgery  · narcotic medicines for pain  · phenothiazines like chlorpromazine, mesoridazine, prochlorperazine, thioridazine  · rifampin  This list may not describe all possible interactions. Give your health care provider a list of all the medicines, herbs, non-prescription drugs, or dietary supplements you use. Also tell them if you smoke, drink alcohol, or use illegal drugs. Some items may interact with your medicine.  What should I watch for while using this medicine?  Visit your doctor or health care professional for regular checks on your progress. Keep a regular sleep schedule by going to bed at about the same time each night. Avoid caffeine-containing drinks in the evening hours. When sleep medicines are used every night for more than a few weeks, they may stop working. Talk to your doctor if you still have trouble sleeping.  After taking this medicine, you may get up out of bed and do an activity that you do not know you are doing. The next morning, you may have no memory of this. Activities include driving a car (\"sleep-driving\"), making and eating food, talking on the phone, sexual activity, and sleep-walking. Serious injuries have occurred. Stop the medicine and call your doctor right away if you find out you have done any of these activities. Do not take this " medicine if you have used alcohol that evening. Do not take it if you have taken another medicine for sleep. The risk of doing these sleep-related activities is higher.  Wait for at least 8 hours after you take a dose before driving or doing other activities that require full mental alertness. Do not take this medicine unless you are able to stay in bed for a full night (7 to 8 hours) before you must be active again. You may have a decrease in mental alertness the day after use, even if you feel that you are fully awake. Tell your doctor if you will need to perform activities requiring full alertness, such as driving, the next day. Do not stand or sit up quickly after taking this medicine, especially if you are an older patient. This reduces the risk of dizzy or fainting spells.  If you or your family notice any changes in your behavior, such as new or worsening depression, thoughts of harming yourself, anxiety, other unusual or disturbing thoughts, or memory loss, call your doctor right away.  After you stop taking this medicine, you may have trouble falling asleep. This is called rebound insomnia. This problem usually goes away on its own after 1 or 2 nights.  What side effects may I notice from receiving this medicine?  Side effects that you should report to your doctor or health care professional as soon as possible:  · allergic reactions like skin rash, itching or hives, swelling of the face, lips, or tongue  · breathing problems  · changes in vision  · confusion  · depressed mood or other changes in moods or emotions  · feeling faint or lightheaded, falls  · hallucinations  · loss of balance or coordination  · loss of memory  · numbness or tingling of the tongue  · restlessness, excitability, or feelings of anxiety or agitation  · signs and symptoms of liver injury like dark yellow or brown urine; general ill feeling or flu-like symptoms; light-colored stools; loss of appetite; nausea; right upper belly pain;  unusually weak or tired; yellowing of the eyes or skin  · suicidal thoughts  · unusual activities while not fully awake like driving, eating, making phone calls, or sexual activity  Side effects that usually do not require medical attention (report to your doctor or health care professional if they continue or are bothersome):  · dizziness  · drowsiness the day after you take this medicine  · headache  This list may not describe all possible side effects. Call your doctor for medical advice about side effects. You may report side effects to FDA at 6-927-PSS-0955.  Where should I keep my medicine?  Keep out of the reach of children. This medicine can be abused. Keep your medicine in a safe place to protect it from theft. Do not share this medicine with anyone. Selling or giving away this medicine is dangerous and against the law.  This medicine may cause accidental overdose and death if taken by other adults, children, or pets. Mix any unused medicine with a substance like cat litter or coffee grounds. Then throw the medicine away in a sealed container like a sealed bag or a coffee can with a lid. Do not use the medicine after the expiration date.  Store at room temperature between 20 and 25 degrees C (68 and 77 degrees F).  NOTE: This sheet is a summary. It may not cover all possible information. If you have questions about this medicine, talk to your doctor, pharmacist, or health care provider.  © 2020 [employer] (2019-09-06 11:51:08)

## 2021-01-06 DIAGNOSIS — E78.5 DYSLIPIDEMIA: ICD-10-CM

## 2021-01-06 RX ORDER — ROSUVASTATIN CALCIUM 5 MG/1
5 TABLET, COATED ORAL EVERY EVENING
Qty: 100 TAB | Refills: 1 | Status: SHIPPED | OUTPATIENT
Start: 2021-01-06 | End: 2021-09-19 | Stop reason: SDUPTHER

## 2021-01-07 ENCOUNTER — APPOINTMENT (OUTPATIENT)
Dept: SLEEP MEDICINE | Facility: MEDICAL CENTER | Age: 66
End: 2021-01-07
Attending: FAMILY MEDICINE
Payer: MEDICARE

## 2021-01-20 ENCOUNTER — APPOINTMENT (OUTPATIENT)
Dept: SLEEP MEDICINE | Facility: MEDICAL CENTER | Age: 66
End: 2021-01-20
Payer: MEDICARE

## 2021-02-11 ENCOUNTER — APPOINTMENT (OUTPATIENT)
Dept: SLEEP MEDICINE | Facility: MEDICAL CENTER | Age: 66
End: 2021-02-11
Attending: FAMILY MEDICINE
Payer: MEDICARE

## 2021-03-03 DIAGNOSIS — Z23 NEED FOR VACCINATION: ICD-10-CM

## 2021-03-23 ENCOUNTER — IMMUNIZATION (OUTPATIENT)
Dept: FAMILY PLANNING/WOMEN'S HEALTH CLINIC | Facility: IMMUNIZATION CENTER | Age: 66
End: 2021-03-23
Attending: INTERNAL MEDICINE
Payer: MEDICARE

## 2021-03-23 DIAGNOSIS — Z23 NEED FOR VACCINATION: ICD-10-CM

## 2021-03-23 DIAGNOSIS — Z23 ENCOUNTER FOR VACCINATION: Primary | ICD-10-CM

## 2021-03-23 PROCEDURE — 0001A PFIZER SARS-COV-2 VACCINE: CPT | Performed by: NURSE PRACTITIONER

## 2021-03-23 PROCEDURE — 91300 PFIZER SARS-COV-2 VACCINE: CPT | Performed by: NURSE PRACTITIONER

## 2021-04-15 ENCOUNTER — IMMUNIZATION (OUTPATIENT)
Dept: FAMILY PLANNING/WOMEN'S HEALTH CLINIC | Facility: IMMUNIZATION CENTER | Age: 66
End: 2021-04-15
Attending: INTERNAL MEDICINE
Payer: MEDICARE

## 2021-04-15 DIAGNOSIS — Z23 ENCOUNTER FOR VACCINATION: Primary | ICD-10-CM

## 2021-04-15 PROCEDURE — 0002A PFIZER SARS-COV-2 VACCINE: CPT

## 2021-04-15 PROCEDURE — 91300 PFIZER SARS-COV-2 VACCINE: CPT

## 2021-08-25 ENCOUNTER — PATIENT MESSAGE (OUTPATIENT)
Dept: HEALTH INFORMATION MANAGEMENT | Facility: OTHER | Age: 66
End: 2021-08-25

## 2021-09-19 DIAGNOSIS — I10 ESSENTIAL HYPERTENSION: ICD-10-CM

## 2021-09-19 DIAGNOSIS — E78.5 DYSLIPIDEMIA: ICD-10-CM

## 2021-09-20 ENCOUNTER — TELEPHONE (OUTPATIENT)
Dept: CARDIOLOGY | Facility: MEDICAL CENTER | Age: 66
End: 2021-09-20

## 2021-09-20 RX ORDER — ROSUVASTATIN CALCIUM 5 MG/1
5 TABLET, COATED ORAL EVERY EVENING
Qty: 100 TABLET | Refills: 0 | Status: SHIPPED | OUTPATIENT
Start: 2021-09-20

## 2021-09-20 RX ORDER — METOPROLOL SUCCINATE 25 MG/1
25 TABLET, EXTENDED RELEASE ORAL DAILY
Qty: 90 TABLET | Refills: 0 | Status: SHIPPED | OUTPATIENT
Start: 2021-09-20

## 2021-09-20 NOTE — TELEPHONE ENCOUNTER
----- Message from Yuly Warner R.N. sent at 9/20/2021 10:14 AM PDT -----  Pt overdue for f/u. Please contact to schedule appt.  Thank you!

## 2021-11-18 ENCOUNTER — TELEPHONE (OUTPATIENT)
Dept: HEALTH INFORMATION MANAGEMENT | Facility: OTHER | Age: 66
End: 2021-11-18

## 2021-11-18 NOTE — TELEPHONE ENCOUNTER
Outcome: Left Message    Please transfer to Patient Outreach Team at [phone] when patient returns call.    Attempt # 2

## 2022-06-22 ENCOUNTER — TELEPHONE (OUTPATIENT)
Dept: HEALTH INFORMATION MANAGEMENT | Facility: OTHER | Age: 67
End: 2022-06-22
Payer: MEDICARE

## 2022-06-27 NOTE — TELEPHONE ENCOUNTER
This is the 4th attempt for [facility]  [place] [facility] [place]

## 2022-11-02 ENCOUNTER — PATIENT MESSAGE (OUTPATIENT)
Dept: HEALTH INFORMATION MANAGEMENT | Facility: OTHER | Age: 67
End: 2022-11-02

## 2022-11-09 ENCOUNTER — DOCUMENTATION (OUTPATIENT)
Dept: HEALTH INFORMATION MANAGEMENT | Facility: OTHER | Age: 67
End: 2022-11-09
Payer: MEDICARE

## 2023-08-10 ENCOUNTER — TELEPHONE (OUTPATIENT)
Dept: HEALTH INFORMATION MANAGEMENT | Facility: OTHER | Age: 68
End: 2023-08-10

## 2024-09-04 ENCOUNTER — PATIENT MESSAGE (OUTPATIENT)
Dept: HEALTH INFORMATION MANAGEMENT | Facility: OTHER | Age: 69
End: 2024-09-04